# Patient Record
Sex: FEMALE | Race: WHITE | NOT HISPANIC OR LATINO | Employment: PART TIME | RURAL
[De-identification: names, ages, dates, MRNs, and addresses within clinical notes are randomized per-mention and may not be internally consistent; named-entity substitution may affect disease eponyms.]

---

## 2020-03-25 ENCOUNTER — HISTORICAL (OUTPATIENT)
Dept: ADMINISTRATIVE | Facility: HOSPITAL | Age: 65
End: 2020-03-25

## 2020-03-25 LAB — GLUCOSE SERPL-MCNC: 189 MG/DL (ref 70–105)

## 2020-03-26 LAB
INSULIN SERPL-ACNC: NORMAL U[IU]/ML
LAB AP CLINICAL INFORMATION: NORMAL
LAB AP COMMENTS: NORMAL
LAB AP DIAGNOSIS - HISTORICAL: NORMAL
LAB AP GROSS PATHOLOGY - HISTORICAL: NORMAL
LAB AP SPECIMEN SUBMITTED - HISTORICAL: NORMAL

## 2020-04-01 ENCOUNTER — HISTORICAL (OUTPATIENT)
Dept: ADMINISTRATIVE | Facility: HOSPITAL | Age: 65
End: 2020-04-01

## 2020-04-01 LAB — GLUCOSE SERPL-MCNC: 166 MG/DL (ref 70–105)

## 2020-04-02 LAB

## 2020-11-16 ENCOUNTER — HISTORICAL (OUTPATIENT)
Dept: ADMINISTRATIVE | Facility: HOSPITAL | Age: 65
End: 2020-11-16

## 2020-12-15 ENCOUNTER — HISTORICAL (OUTPATIENT)
Dept: ADMINISTRATIVE | Facility: HOSPITAL | Age: 65
End: 2020-12-15

## 2020-12-15 LAB
GLUCOSE SERPL-MCNC: 130 MG/DL (ref 70–105)
GLUCOSE SERPL-MCNC: 166 MG/DL (ref 70–105)
HCT VFR BLD AUTO: 33.8 % (ref 38–47)
HGB BLD-MCNC: 11.9 G/DL (ref 12–16)
POTASSIUM SERPL-SCNC: 3.3 MMOL/L (ref 3.5–5.1)

## 2021-02-04 ENCOUNTER — HISTORICAL (OUTPATIENT)
Dept: ADMINISTRATIVE | Facility: HOSPITAL | Age: 66
End: 2021-02-04

## 2021-02-05 ENCOUNTER — HISTORICAL (OUTPATIENT)
Dept: ADMINISTRATIVE | Facility: HOSPITAL | Age: 66
End: 2021-02-05

## 2021-07-26 ENCOUNTER — PROCEDURE VISIT (OUTPATIENT)
Dept: SLEEP MEDICINE | Facility: HOSPITAL | Age: 66
End: 2021-07-26
Payer: COMMERCIAL

## 2021-07-26 DIAGNOSIS — G47.30 SLEEP APNEA: ICD-10-CM

## 2021-07-26 DIAGNOSIS — G47.30 SLEEP APNEA: Primary | ICD-10-CM

## 2021-07-26 PROCEDURE — 95806 SLEEP STUDY UNATT&RESP EFFT: CPT | Mod: PO

## 2021-09-22 ENCOUNTER — OFFICE VISIT (OUTPATIENT)
Dept: CARDIOLOGY | Facility: CLINIC | Age: 66
End: 2021-09-22
Payer: COMMERCIAL

## 2021-09-22 VITALS
HEIGHT: 64 IN | BODY MASS INDEX: 31.76 KG/M2 | SYSTOLIC BLOOD PRESSURE: 112 MMHG | HEART RATE: 70 BPM | RESPIRATION RATE: 16 BRPM | WEIGHT: 186 LBS | DIASTOLIC BLOOD PRESSURE: 70 MMHG

## 2021-09-22 DIAGNOSIS — I10 HYPERTENSION, ESSENTIAL: ICD-10-CM

## 2021-09-22 DIAGNOSIS — K21.9 GASTROESOPHAGEAL REFLUX DISEASE WITHOUT ESOPHAGITIS: ICD-10-CM

## 2021-09-22 DIAGNOSIS — E78.5 HYPERLIPIDEMIA, UNSPECIFIED HYPERLIPIDEMIA TYPE: ICD-10-CM

## 2021-09-22 DIAGNOSIS — E11.9 DIABETES MELLITUS WITHOUT COMPLICATION: ICD-10-CM

## 2021-09-22 DIAGNOSIS — I35.8 AORTIC VALVE SCLEROSIS: Primary | ICD-10-CM

## 2021-09-22 PROCEDURE — 93005 ELECTROCARDIOGRAM TRACING: CPT | Mod: PBBFAC | Performed by: INTERNAL MEDICINE

## 2021-09-22 PROCEDURE — 99214 OFFICE O/P EST MOD 30 MIN: CPT | Mod: PBBFAC | Performed by: INTERNAL MEDICINE

## 2021-09-22 PROCEDURE — 99214 PR OFFICE/OUTPT VISIT, EST, LEVL IV, 30-39 MIN: ICD-10-PCS | Mod: S$PBB,,, | Performed by: INTERNAL MEDICINE

## 2021-09-22 PROCEDURE — 93010 EKG 12-LEAD: ICD-10-PCS | Mod: S$PBB,,, | Performed by: INTERNAL MEDICINE

## 2021-09-22 PROCEDURE — 99214 OFFICE O/P EST MOD 30 MIN: CPT | Mod: S$PBB,,, | Performed by: INTERNAL MEDICINE

## 2021-09-22 PROCEDURE — 93010 ELECTROCARDIOGRAM REPORT: CPT | Mod: S$PBB,,, | Performed by: INTERNAL MEDICINE

## 2021-09-27 PROBLEM — I10 HYPERTENSION, ESSENTIAL: Status: ACTIVE | Noted: 2021-09-27

## 2021-09-27 PROBLEM — K21.9 GASTROESOPHAGEAL REFLUX DISEASE WITHOUT ESOPHAGITIS: Status: ACTIVE | Noted: 2021-09-27

## 2021-09-27 PROBLEM — I35.8 AORTIC VALVE SCLEROSIS: Status: ACTIVE | Noted: 2021-09-27

## 2021-09-27 PROBLEM — E11.9 DIABETES MELLITUS WITHOUT COMPLICATION: Status: ACTIVE | Noted: 2021-09-27

## 2021-09-27 PROBLEM — E78.5 HYPERLIPIDEMIA: Status: ACTIVE | Noted: 2021-09-27

## 2022-01-31 ENCOUNTER — HOSPITAL ENCOUNTER (OUTPATIENT)
Dept: RADIOLOGY | Facility: HOSPITAL | Age: 67
Discharge: HOME | End: 2022-01-31
Attending: FAMILY MEDICINE
Payer: COMMERCIAL

## 2022-01-31 ENCOUNTER — HOSPITAL ENCOUNTER (OUTPATIENT)
Dept: RADIOLOGY | Facility: HOSPITAL | Age: 67
Discharge: HOME | End: 2022-01-31
Attending: FAMILY MEDICINE
Payer: MEDICARE

## 2022-01-31 DIAGNOSIS — M25.579 ANKLE PAIN: ICD-10-CM

## 2022-01-31 DIAGNOSIS — M79.673 FOOT PAIN: Primary | ICD-10-CM

## 2022-01-31 DIAGNOSIS — M79.673 FOOT PAIN: ICD-10-CM

## 2022-01-31 PROCEDURE — 73630 X-RAY EXAM OF FOOT: CPT | Mod: TC,LT

## 2022-01-31 PROCEDURE — 73610 X-RAY EXAM OF ANKLE: CPT | Mod: TC,LT

## 2022-02-07 ENCOUNTER — HOSPITAL ENCOUNTER (OUTPATIENT)
Dept: CARDIOLOGY | Facility: HOSPITAL | Age: 67
Discharge: HOME | End: 2022-02-07
Attending: INTERNAL MEDICINE
Payer: COMMERCIAL

## 2022-02-07 DIAGNOSIS — I35.8 AORTIC VALVE SCLEROSIS: ICD-10-CM

## 2022-02-07 PROCEDURE — 93306 TTE W/DOPPLER COMPLETE: CPT | Mod: 26,,, | Performed by: INTERNAL MEDICINE

## 2022-02-07 PROCEDURE — 93306 TTE W/DOPPLER COMPLETE: CPT

## 2022-02-07 PROCEDURE — 93306 ECHO (CUPID ONLY): ICD-10-PCS | Mod: 26,,, | Performed by: INTERNAL MEDICINE

## 2022-02-21 ENCOUNTER — HOSPITAL ENCOUNTER (OUTPATIENT)
Dept: RADIOLOGY | Facility: HOSPITAL | Age: 67
Discharge: HOME | End: 2022-02-21
Attending: ORTHOPAEDIC SURGERY
Payer: MEDICARE

## 2022-02-21 DIAGNOSIS — M25.572 LEFT ANKLE PAIN, UNSPECIFIED CHRONICITY: ICD-10-CM

## 2022-02-21 PROBLEM — M76.72 PERONEAL TENDINITIS OF LEFT LOWER LEG: Status: ACTIVE | Noted: 2022-02-21

## 2022-02-21 PROCEDURE — 73610 X-RAY EXAM OF ANKLE: CPT | Mod: TC,LT

## 2022-03-07 ENCOUNTER — HOSPITAL ENCOUNTER (OUTPATIENT)
Dept: RADIOLOGY | Facility: HOSPITAL | Age: 67
Discharge: HOME | End: 2022-03-07
Payer: MEDICARE

## 2022-03-07 VITALS — HEIGHT: 64 IN | BODY MASS INDEX: 31.76 KG/M2 | WEIGHT: 186 LBS

## 2022-03-07 DIAGNOSIS — Z12.31 VISIT FOR SCREENING MAMMOGRAM: ICD-10-CM

## 2022-03-07 PROCEDURE — 77067 MAMMO DIGITAL SCREENING BILAT: ICD-10-PCS | Mod: 26,,, | Performed by: RADIOLOGY

## 2022-03-07 PROCEDURE — 77067 SCR MAMMO BI INCL CAD: CPT | Mod: TC

## 2022-03-07 PROCEDURE — 77067 SCR MAMMO BI INCL CAD: CPT | Mod: 26,,, | Performed by: RADIOLOGY

## 2022-03-10 ENCOUNTER — CLINICAL SUPPORT (OUTPATIENT)
Dept: REHABILITATION | Facility: HOSPITAL | Age: 67
End: 2022-03-10
Payer: MEDICARE

## 2022-03-10 DIAGNOSIS — M76.72 PERONEAL TENDINITIS OF LEFT LOWER LEG: ICD-10-CM

## 2022-03-10 DIAGNOSIS — M25.572 LEFT ANKLE PAIN, UNSPECIFIED CHRONICITY: ICD-10-CM

## 2022-03-10 PROCEDURE — 97035 APP MDLTY 1+ULTRASOUND EA 15: CPT

## 2022-03-10 PROCEDURE — 97161 PT EVAL LOW COMPLEX 20 MIN: CPT

## 2022-03-10 NOTE — PLAN OF CARE
[facility]  Physical Therapy Initial Evaluation    Name: Rafia Singh  Clinic Number: [id number]    Therapy Diagnosis:   Encounter Diagnoses   Name Primary?    Left ankle pain, unspecified chronicity     Peroneal tendinitis of left lower leg      Physician: Antonio Squires MD    Physician Orders: PT Eval and Treat   Medical Diagnosis from Referral: left ankle peroneal tendinitis   Evaluation Date: 3/10/2022  Progress Report due 4/14/22  Plan of Care Expiration: 4/15/22  Visit # / Visits authorized: 1/ 11    Time In: 0935  Time Out: 1020  Total Appointment Time (timed & untimed codes): 45 minutes    Precautions: Standard    Subjective   Date of onset: 7 weeks of ankle pain with insidious onset  History of current condition - Rafia reports: She had a knot that came up on the back of her foot and was painful about 7 weeks ago.  She is having severe pain in all positions and all activities.  She is having burning in her ankle.  Her MRI showed a stress fracture of her Cuboid bone.      Medical History:   Past Medical History:   Diagnosis Date    Diabetes mellitus, type 2     NIDDM    GERD (gastroesophageal reflux disease)     High cholesterol     High triglycerides     Hypertension     Murmur     MVP (mitral valve prolapse)        Surgical History:   Rafia Singh  has a past surgical history that includes Tubal ligation; Hysterectomy; Cholecystectomy (2010); trigger thumb; Shoulder arthroscopy (Left, 12/2020); Sinus surgery; and Oophorectomy.    Medications:   Rafia has a current medication list which includes the following prescription(s): amlodipine, colestipol, furosemide, glimepiride, lisinopril, metformin, omeprazole, and sertraline.    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 with weight bearing  Prior Level of Function: independent  Current Level of Function: limiting work due to pain    Pain:  Current 7/10, worst 10/10, best 6/10   Location: left feet   Description: Burning and Throbbing  Aggravating Factors: "everything hurts, standing, sitting, lying down"  Easing Factors: pain medication, ice, heating pad and hot bath    Pts goals: reduce her pain and return to full time work     Objective     Incisions: N/A  Girth Measurements: Right 36.5cm Left 37.5cm  Comments:      Range of motion:  Motion Right Left    Hip flexion  WITHIN FUNCTIONAL LIMITS  WITHIN FUNCTIONAL LIMITS   Hip extension  WITHIN FUNCTIONAL LIMITS  WITHIN FUNCTIONAL LIMITS   Hip abduction  WITHIN FUNCTIONAL LIMITS  WITHIN FUNCTIONAL LIMITS   Hip adduction  WITHIN FUNCTIONAL LIMITS  WITHIN FUNCTIONAL LIMITS   Internal rotation  WITHIN FUNCTIONAL LIMITS  WITHIN FUNCTIONAL LIMITS   External rotation  WITHIN FUNCTIONAL LIMITS  WITHIN FUNCTIONAL LIMITS   Knee extension  WITHIN FUNCTIONAL LIMITS  WITHIN FUNCTIONAL LIMITS   Knee flexion  WITHIN FUNCTIONAL LIMITS  WITHIN FUNCTIONAL LIMITS   Ankle DF  5  5   Ankle PF  58  57   Ankle Inversion  34  38   Ankle Eversion  13  15       Manual muscle test   Muscle Right  Left    Hip flexion  MMT strength: 5/5  MMT strength: 5/5   Hip extension  MMT strength: 5/5  MMT strength: 5/5   Hip abduction  MMT strength: 5/5  MMT strength: 5/5   Hip adduction  MMT strength: 5/5  MMT strength: 5/5   Hip internal rotation  MMT strength: 5/5  MMT strength: 5/5   Hip external rotation  MMT strength: 5/5  MMT strength: 5/5   Knee extension  MMT strength: 5/5  MMT strength: 5/5   Knee flexion  MMT strength: 5/5  MMT strength: 5/5   Ankle DF  MMT strength: 5/5  MMT strength: 3+/5   Ankle PF  MMT strength: 5/5  MMT strength: 3+/5   Ankle inversion  MMT strength: 5/5  MMT strength: 3+/5   Ankle eversion  MMT " strength: 5/5  MMT strength: 3+/5       Gait:  Weight bearing precautions: FWB  Assistive device: none  Ambulation distance and deviations: Patient ambulating with walking boot on left foot with minimal gait deviations  Stairs:NT  Comments:      Clinical Special Tests: Patient with pain with palpation of left peroneal tendon path as well as left achilles tendon  Comments:    Limitation/Restriction for FOTO Intake Survey    Therapist reviewed FOTO scores for Rafia Singh on 3/10/2022.   FOTO documents entered into [employer] - see Media section.    Limitation Score: 41%         TREATMENT   Treatment Time In: 0955  Treatment Time Out: 1010  Total Treatment time (time-based codes) separate from Evaluation: 15 minutes    Rafia received the treatments listed below:  THERAPEUTIC EXERCISES to develop flexibility for 5 minutes including left ankle peroneal stretch x 3 wtih 10 second holds and gastroc stretch with towel x 30 sec x 1  DIRECT CONTACT MODALITIES after being cleared for contraindications: Ultrasound:  Rafia received ultrasound to manage pain and inflammation at 3 % duty cycle applied to the left ankle at an intensity of  1.6 W/cm2  for a duration of 8  minutes. Patient tolerated treatment well without adverse effects. Therapist was in attendance throughout intervention..    Home Exercises and Patient Education Provided    Education provided:   - Eval results, Plan of care, and HEP    Written Home Exercises Provided: yes.  Exercises were reviewed and Rafia was able to demonstrate them prior to the end of the session.  Rafia demonstrated good  understanding of the education provided.     See EMR under Patient Instructions for exercises provided 3/10/2022.    Assessment   Rafia is a 66 y.o. female referred to outpatient Physical Therapy with a medical diagnosis of left peroneal tendonitis. Pt presents with pain in left foot with all positions, discomfort with ROM and stretches, and pain with ambulation.  I feel PT  intervention is indicated    Pt prognosis is Good.   Pt will benefit from skilled outpatient Physical Therapy to address the deficits stated above and in the chart below, provide pt/family education, and to maximize pt's level of independence.     Plan of care discussed with patient: Yes  Pt's spiritual, cultural and educational needs considered and patient is agreeable to the plan of care and goals as stated below:     Anticipated Barriers for therapy: patient reports LE nerve sensations with burning possibly indicative of neuropathy    Medical Necessity is demonstrated by the following  History  Co-morbidities and personal factors that may impact the plan of care Co-morbidities:   advanced age    Personal Factors:   no deficits     low   Examination  Body Structures and Functions, activity limitations and participation restrictions that may impact the plan of care Body Regions:   lower extremities    Body Systems:    gross symmetry  ROM  strength  gross coordinated movement  balance  gait  transfers    Participation Restrictions:   pain    Activity limitations:   Learning and applying knowledge  no deficits    General Tasks and Commands  no deficits    Communication  no deficits    Mobility  walking    Self care  no deficits    Domestic Life  doing house work (cleaning house, washing dishes, laundry)    Interactions/Relationships  no deficits    Life Areas  employment    Community and Social Life  no deficits         moderate   Clinical Presentation stable and uncomplicated low   Decision Making/ Complexity Score: low     Goals:  Short Term Goals: 3 weeks   1. Patient will be independent with HEP for therapy carryover    Long Term Goals: 5 weeks   1. Patient will report pain of 4/10 with work and household activities  2. Patient will demonstrate ability to ambulate without deviation on level surfaces.  3.  Patient will tolerate 30 min of therapy without significant rest to facilitate return to work     Plan   Plan  of care Certification: 3/10/2022 to 4/15/22.    Outpatient Physical Therapy 2 times weekly for 5 weeks to include the following interventions: Electrical Stimulation for pain, Gait Training, Manual Therapy, Moist Heat/ Ice, Neuromuscular Re-ed, Patient Education, Therapeutic Activities, Therapeutic Exercise and Ultrasound.     BENI ARAYA, PT, ATP  03/10/2022

## 2022-03-15 ENCOUNTER — CLINICAL SUPPORT (OUTPATIENT)
Dept: REHABILITATION | Facility: HOSPITAL | Age: 67
End: 2022-03-15
Payer: MEDICARE

## 2022-03-15 DIAGNOSIS — M76.72 PERONEAL TENDINITIS OF LEFT LOWER LEG: Primary | ICD-10-CM

## 2022-03-15 PROCEDURE — 97140 MANUAL THERAPY 1/> REGIONS: CPT | Mod: CQ

## 2022-03-15 PROCEDURE — 97110 THERAPEUTIC EXERCISES: CPT | Mod: CQ

## 2022-03-15 PROCEDURE — 97035 APP MDLTY 1+ULTRASOUND EA 15: CPT | Mod: CQ

## 2022-03-15 NOTE — PROGRESS NOTES
"  Physical Therapy Treatment Note     Name: Rafia Singh  Clinic Number: [id number]    Therapy Diagnosis: No diagnosis found.  Physician: Antonio Squires MD    Visit Date: 3/15/2022    Physician Orders: PT Eval and Treat   Medical Diagnosis from Referral: left ankle peroneal tendinitis       Evaluation Date: 3/10/2022  Progress Report due 4/14/22  Plan of Care Expiration: 4/15/22  Visit # / Visits authorized: 2/ 11  PTA Visit #: 1    Time In: 1525  Time Out: 1613  Total Billable Time: 48 minutes    Precautions: Standard       Subjective     Pt reports: "It just hurts constantly and is usually worse at night."  She was compliant with home exercise program.  Response to previous treatment: none yet  Functional change: none yet    Pain: 5/10  Location: left foot      Objective     Rafia received therapeutic exercises to develop strength and ROM for 32 minutes including:  NuStep x 6 min  Theraband PF with Inversion using yellow band 2 x 10  Theraband DF with yellow band 2 x 10  Left peroneal stretch 3 x 20"  DF towel stretch 3 x 30"  Inversion/Eversion stretch 3 x 20" each  [place]       Rafia received the following manual therapy techniques: Ice massage were applied to the: left ankle for 8 minutes, including:  Ice massage to Left ankle    Rafia received the following direct contact modalities after being cleared for contraindications: Ultrasound:  Rafia received ultrasound to manage pain and inflammation at 50 % duty cycle applied to the left ankle at an intensity of 1.5 W/cm2  for a duration of 8 minutes. Patient tolerated treatment well without adverse effects. Therapist was in attendance throughout intervention.      Home Exercises Provided and Patient Education Provided     Education provided: on eval    Written Home Exercises Provided: Patient instructed to cont prior HEP.  Exercises were reviewed and Rafia was able to demonstrate them prior to the end of the session.  Rafia demonstrated good  " understanding of the education provided.     See EMR under Patient Instructions for exercises provided 3/10/2022.    Assessment     Patient with no new complaints this visit.    Rafia Is progressing well towards her goals.   Pt prognosis is Good.     Pt will continue to benefit from skilled outpatient physical therapy to address the deficits listed in the problem list box on initial evaluation, provide pt/family education and to maximize pt's level of independence in the home and community environment.     Pt's spiritual, cultural and educational needs considered and pt agreeable to plan of care and goals.     Anticipated barriers to physical therapy: patient reports LE nerve sensations with burning possibly indicative of neuropathy    Goals:  Short Term Goals: 3 weeks   1. Patient will be independent with HEP for therapy carryover     Long Term Goals: 5 weeks   1. Patient will report pain of 4/10 with work and household activities  2. Patient will demonstrate ability to ambulate without deviation on level surfaces.  3.  Patient will tolerate 30 min of therapy without significant rest to facilitate return to work     Plan     Will continue with POC as appropriate.    Tony Mann, PTA  3/15/2022

## 2022-03-18 ENCOUNTER — CLINICAL SUPPORT (OUTPATIENT)
Dept: REHABILITATION | Facility: HOSPITAL | Age: 67
End: 2022-03-18
Payer: MEDICARE

## 2022-03-18 DIAGNOSIS — M76.72 PERONEAL TENDINITIS OF LEFT LOWER LEG: Primary | ICD-10-CM

## 2022-03-18 PROCEDURE — 97035 APP MDLTY 1+ULTRASOUND EA 15: CPT | Mod: CQ

## 2022-03-18 PROCEDURE — 97110 THERAPEUTIC EXERCISES: CPT | Mod: CQ

## 2022-03-18 NOTE — PROGRESS NOTES
"  Physical Therapy Treatment Note     Name: Rafia Singh  [facility] Number: [id number]    Therapy Diagnosis: No diagnosis found.  Physician: Antonio Squires MD    Visit Date: 3/18/2022    Physician Orders: PT Eval and Treat   Medical Diagnosis from Referral: left ankle peroneal tendinitis       Evaluation Date: 3/10/2022  Progress Report due 4/14/22  Plan of Care Expiration: 4/15/22  Visit # / Visits authorized: 3/ 11  PTA Visit #: 2    Time In: 1402 (Patient tx started prior to being checked in)  Time Out: 1443  Total Billable Time: 41 minutes    Precautions: Standard       Subjective     Pt reports: "I haven't sleep at all the past 3 nights. It just constantly burns all night."  She was compliant with home exercise program.  Response to previous treatment: none yet  Functional change: none yet    Pain: 6/10  Location: left foot      Objective     Rafia received therapeutic exercises to develop strength and ROM for 30 minutes including:  NuStep x 6 min  Theraband PF with Inversion using yellow band 2 x 10  Theraband DF with yellow band 2 x 10  Left peroneal stretch 3 x 20"  DF towel stretch 3 x 30"  Inversion/Eversion stretch 3 x 20" each  [place]       Rafia received the following manual therapy techniques: Ice massage were applied to the: left ankle for 5 minutes, including:  Ice massage to Left ankle    Rafia received the following direct contact modalities after being cleared for contraindications: Ultrasound:  Rafia received ultrasound to manage pain and inflammation at 50 % duty cycle applied to the left ankle at an intensity of 1.5 W/cm2  for a duration of 8 minutes. Patient tolerated treatment well without adverse effects. Therapist was in attendance throughout intervention.      Home Exercises Provided and Patient Education Provided     Education provided: on eval    Written Home Exercises Provided: Patient instructed to cont prior HEP.  Exercises were reviewed and Rafia was able to demonstrate " them prior to the end of the session.  Rafia demonstrated good  understanding of the education provided.     See EMR under Patient Instructions for exercises provided 3/10/2022.    Assessment     Patient with no new complaints this visit.    Rfaia Is progressing well towards her goals.   Pt prognosis is Good.     Pt will continue to benefit from skilled outpatient physical therapy to address the deficits listed in the problem list box on initial evaluation, provide pt/family education and to maximize pt's level of independence in the home and community environment.     Pt's spiritual, cultural and educational needs considered and pt agreeable to plan of care and goals.     Anticipated barriers to physical therapy: patient reports LE nerve sensations with burning possibly indicative of neuropathy    Goals:  Short Term Goals: 3 weeks   1. Patient will be independent with HEP for therapy carryover     Long Term Goals: 5 weeks   1. Patient will report pain of 4/10 with work and household activities  2. Patient will demonstrate ability to ambulate without deviation on level surfaces.  3.  Patient will tolerate 30 min of therapy without significant rest to facilitate return to work     Plan     Will continue with POC as appropriate.    Tony Mann, MACARIO  3/18/2022

## 2022-03-23 ENCOUNTER — CLINICAL SUPPORT (OUTPATIENT)
Dept: REHABILITATION | Facility: HOSPITAL | Age: 67
End: 2022-03-23
Payer: MEDICARE

## 2022-03-23 DIAGNOSIS — M76.72 PERONEAL TENDINITIS OF LEFT LOWER LEG: Primary | ICD-10-CM

## 2022-03-23 LAB
AORTIC ROOT ANNULUS: 2.1 CM
AORTIC VALVE CUSP SEPERATION: 17.3 CM
AV INDEX (PROSTH): 0.55
AV MEAN GRADIENT: 6 MMHG
AV PEAK GRADIENT: 4 MMHG
AV VALVE AREA: 1.24 CM2
AV VELOCITY RATIO: 1
CV ECHO LV RWT: 0.41 CM
DOP CALC AO PEAK VEL: 1 M/S
DOP CALC AO VTI: 31 CM
DOP CALC LVOT AREA: 2.3 CM2
DOP CALC LVOT DIAMETER: 1.7 CM
DOP CALC LVOT PEAK VEL: 1 M/S
DOP CALC LVOT STROKE VOLUME: 38.57 CM3
DOP CALC MV VTI: 34 CM
DOP CALCLVOT PEAK VEL VTI: 17 CM
E WAVE DECELERATION TIME: 238 MSEC
ECHO EF ESTIMATED: 60 %
ECHO LV POSTERIOR WALL: 0.95 CM (ref 0.6–1.1)
EJECTION FRACTION: 60 %
FRACTIONAL SHORTENING: 34 % (ref 28–44)
INTERVENTRICULAR SEPTUM: 1.03 CM (ref 0.6–1.1)
IVC OSTIUM: 1 CM
LEFT ATRIUM SIZE: 3.3 CM
LEFT INTERNAL DIMENSION IN SYSTOLE: 3 CM (ref 2.1–4)
LEFT VENTRICLE DIASTOLIC VOLUME: 96.3 ML
LEFT VENTRICLE SYSTOLIC VOLUME: 35 ML
LEFT VENTRICULAR INTERNAL DIMENSION IN DIASTOLE: 4.58 CM (ref 3.5–6)
LEFT VENTRICULAR MASS: 155.54 G
LVOT MG: 2 MMHG
MV MEAN GRADIENT: 3 MMHG
MV PEAK E VEL: 1.11 M/S
MV PEAK GRADIENT: 8 MMHG
MV STENOSIS PRESSURE HALF TIME: 79 MS
MV VALVE AREA BY CONTINUITY EQUATION: 1.13 CM2
MV VALVE AREA P 1/2 METHOD: 2.78 CM2
PISA TR MAX VEL: 2.3 M/S
RA MAJOR: 3.8 CM
RA PRESSURE: 3 MMHG
RIGHT VENTRICULAR END-DIASTOLIC DIMENSION: 4.5 CM
TR MAX PG: 21 MMHG
TRICUSPID ANNULAR PLANE SYSTOLIC EXCURSION: 1.9 CM
TV REST PULMONARY ARTERY PRESSURE: 24 MMHG

## 2022-03-23 PROCEDURE — 97110 THERAPEUTIC EXERCISES: CPT | Mod: CQ

## 2022-03-23 PROCEDURE — 97035 APP MDLTY 1+ULTRASOUND EA 15: CPT | Mod: CQ

## 2022-03-23 NOTE — PROGRESS NOTES
"  Physical Therapy Treatment Note     Name: Rafia Singh  [facility] Number: [id number]    Therapy Diagnosis: No diagnosis found.  Physician: Antonio Squires MD    Visit Date: 3/23/2022    Physician Orders: PT Eval and Treat   Medical Diagnosis from Referral: left ankle peroneal tendinitis       Evaluation Date: 3/10/2022  Progress Report due 4/14/22  Plan of Care Expiration: 4/15/22  Visit # / Visits authorized: 4/ 11  PTA Visit #: 3    Time In: 0759 (Patient tx started prior to being checked in)  Time Out: 0841  Total Billable Time: 42 minutes    Precautions: Standard       Subjective     Pt reports: "I had about 1 hour of sleep last night. It's definitely a problem."  She was compliant with home exercise program.  Response to previous treatment: none yet  Functional change: none yet    Pain: 6/10  Location: left foot      Objective     Rafia received therapeutic exercises to develop strength and ROM for 34 minutes including:  NuStep x 6 min  Theraband PF with Inversion using yellow band 2 x 10  Theraband DF with yellow band 2 x 10  Left peroneal stretch 3 x 20"  DF towel stretch 3 x 30"  Inversion/Eversion stretch 3 x 20" each  [place]       Rafia received the following direct contact modalities after being cleared for contraindications: Ultrasound:  Rafia received ultrasound to manage pain and inflammation at 100 % duty cycle applied to the left ankle at an intensity of 1.5 W/cm2  for a duration of 8 minutes. Patient tolerated treatment well without adverse effects. Therapist was in attendance throughout intervention.      Home Exercises Provided and Patient Education Provided     Education provided: on eval    Written Home Exercises Provided: Patient instructed to cont prior HEP.  Exercises were reviewed and Rafia was able to demonstrate them prior to the end of the session.  Rafia demonstrated good  understanding of the education provided.     See EMR under Patient Instructions for exercises provided " 3/10/2022.    Assessment     Patient able to perform all exercises with no increase in pain.    Rafia Is progressing well towards her goals.   Pt prognosis is Good.     Pt will continue to benefit from skilled outpatient physical therapy to address the deficits listed in the problem list box on initial evaluation, provide pt/family education and to maximize pt's level of independence in the home and community environment.     Pt's spiritual, cultural and educational needs considered and pt agreeable to plan of care and goals.     Anticipated barriers to physical therapy: patient reports LE nerve sensations with burning possibly indicative of neuropathy    Goals:  Short Term Goals: 3 weeks   1. Patient will be independent with HEP for therapy carryover     Long Term Goals: 5 weeks   1. Patient will report pain of 4/10 with work and household activities  2. Patient will demonstrate ability to ambulate without deviation on level surfaces.  3.  Patient will tolerate 30 min of therapy without significant rest to facilitate return to work     Plan     Will continue with POC as appropriate.    Tony Mann, PTA  3/23/2022

## 2022-03-24 ENCOUNTER — CLINICAL SUPPORT (OUTPATIENT)
Dept: REHABILITATION | Facility: HOSPITAL | Age: 67
End: 2022-03-24
Payer: MEDICARE

## 2022-03-24 DIAGNOSIS — M76.72 PERONEAL TENDINITIS OF LEFT LOWER LEG: Primary | ICD-10-CM

## 2022-03-24 PROCEDURE — 97110 THERAPEUTIC EXERCISES: CPT | Mod: CQ

## 2022-03-24 NOTE — PROGRESS NOTES
"  Physical Therapy Treatment Note     Name: Rafia Singh  [facility] Number: [id number]    Therapy Diagnosis: No diagnosis found.  Physician: Antonio Squires MD    Visit Date: 3/24/2022    Physician Orders: PT Eval and Treat   Medical Diagnosis from Referral: left ankle peroneal tendinitis       Evaluation Date: 3/10/2022  Progress Report due 4/14/22  Plan of Care Expiration: 4/15/22  Visit # / Visits authorized: 5/ 11  PTA Visit #: 4    Time In: 1538  Time Out: 1625  Total Billable Time: 47 minutes    Precautions: Standard       Subjective     Pt reports: Patient stated her pain is doing better today then it was yesterday  She was compliant with home exercise program.  Response to previous treatment: none yet  Functional change: none yet    Pain: 4/10  Location: left foot      Objective     Rafia received therapeutic exercises to develop strength and ROM for 47 minutes including:  NuStep x 6 min  Theraband PF with Inversion using yellow band 2 x 10  Theraband DF with yellow band 2 x 10  Left peroneal stretch 3 x 20"  DF towel stretch 3 x 30"  Inversion/Eversion stretch 3 x 20" each  [place]       Rafia received the following direct contact modalities after being cleared for contraindications: Ultrasound:  Rafia received ultrasound to manage pain and inflammation at 100 % duty cycle applied to the left ankle at an intensity of 1.5 W/cm2  for a duration of 0 minutes. Patient tolerated treatment well without adverse effects. Therapist was in attendance throughout intervention.      Home Exercises Provided and Patient Education Provided     Education provided: on eval    Written Home Exercises Provided: Patient instructed to cont prior HEP.  Exercises were reviewed and Rafia was able to demonstrate them prior to the end of the session.  Rafia demonstrated good  understanding of the education provided.     See EMR under Patient Instructions for exercises provided 3/10/2022.    Assessment     Patient able to " perform all exercises with no increase in pain.    Rafia Is progressing well towards her goals.   Pt prognosis is Good.     Pt will continue to benefit from skilled outpatient physical therapy to address the deficits listed in the problem list box on initial evaluation, provide pt/family education and to maximize pt's level of independence in the home and community environment.     Pt's spiritual, cultural and educational needs considered and pt agreeable to plan of care and goals.     Anticipated barriers to physical therapy: patient reports LE nerve sensations with burning possibly indicative of neuropathy    Goals:  Short Term Goals: 3 weeks   1. Patient will be independent with HEP for therapy carryover     Long Term Goals: 5 weeks   1. Patient will report pain of 4/10 with work and household activities  2. Patient will demonstrate ability to ambulate without deviation on level surfaces.  3.  Patient will tolerate 30 min of therapy without significant rest to facilitate return to work     Plan     Will continue with POC as appropriate.    Soledad Gibson, PTA  3/24/2022

## 2022-04-01 ENCOUNTER — CLINICAL SUPPORT (OUTPATIENT)
Dept: REHABILITATION | Facility: HOSPITAL | Age: 67
End: 2022-04-01
Payer: MEDICARE

## 2022-04-01 DIAGNOSIS — M76.72 PERONEAL TENDINITIS OF LEFT LOWER LEG: ICD-10-CM

## 2022-04-01 PROCEDURE — 97035 APP MDLTY 1+ULTRASOUND EA 15: CPT

## 2022-04-01 PROCEDURE — 97140 MANUAL THERAPY 1/> REGIONS: CPT

## 2022-04-01 PROCEDURE — 97110 THERAPEUTIC EXERCISES: CPT

## 2022-04-01 NOTE — PROGRESS NOTES
Physical Therapy Treatment Note     Name: Rafia Singh  [facility] Number: [id number]    Therapy Diagnosis: No diagnosis found.  Physician: Antonio Squires MD    Visit Date: 4/1/2022    Physician Orders: PT Eval and Treat   Medical Diagnosis from Referral: left ankle peroneal tendinitis       Evaluation Date: 3/10/2022  Progress Report due 4/14/22  Plan of Care Expiration: 4/15/22  Visit # / Visits authorized: 6/ 11  PTA Visit #: 0    Time In: 1:58 pm  Time Out: 2:55 pm  Total Billable Time: 57 minutes    Precautions: Standard       Subjective     Pt reports: she's feeling pretty good today  She was compliant with home exercise program.  Response to previous treatment: none yet  Functional change: none yet    Pain: 5/10- but hasn't taken any medication  Location: left foot      Objective     Rafia received therapeutic exercises to develop strength and ROM for 20 minutes including:  NuStep x 5 min  4 way ankle with band: red x 20  Towel scrunches: x 30     Rafia received manual therapy to left ankle for 24 minutes including:  Massage to left lateral ankle to decrease pain and swelling  KT tape applied to lateral ankle to pull edema from ankle    Rafai received the following direct contact modalities after being cleared for contraindications: Ultrasound:  Rafia received ultrasound to manage pain and inflammation at 100 % duty cycle applied to the left ankle at an intensity of 1.0 W/cm2  for a duration of 8 minutes. Patient tolerated treatment well without adverse effects. Therapist was in attendance throughout intervention.    Rafia received ice massage to left lateral ankle for 5 minutes to decrease pain and inflammation. No adverse reactions noted.       Home Exercises Provided and Patient Education Provided     Education provided: on eval    Written Home Exercises Provided: Patient instructed to cont prior HEP.  Exercises were reviewed and Rafia was able to demonstrate them prior to the end of the session.   Rafia demonstrated good  understanding of the education provided.     See EMR under Patient Instructions for exercises provided 3/10/2022.    Assessment     Patient tolerated all activities well. She reported relief at end of treatment. She was educated to continue to elevate and ice left ankle to decrease swelling. She verbalized understanding.     Rafia Is progressing well towards her goals.   Pt prognosis is Good.     Pt will continue to benefit from skilled outpatient physical therapy to address the deficits listed in the problem list box on initial evaluation, provide pt/family education and to maximize pt's level of independence in the home and community environment.     Pt's spiritual, cultural and educational needs considered and pt agreeable to plan of care and goals.     Anticipated barriers to physical therapy: patient reports LE nerve sensations with burning possibly indicative of neuropathy    Goals:  Short Term Goals: 3 weeks   1. Patient will be independent with HEP for therapy carryover     Long Term Goals: 5 weeks   1. Patient will report pain of 4/10 with work and household activities  2. Patient will demonstrate ability to ambulate without deviation on level surfaces.  3.  Patient will tolerate 30 min of therapy without significant rest to facilitate return to work     Plan     Will continue with POC as appropriate.    Andreia Durant DPT, ATC   4/1/2022

## 2022-04-04 ENCOUNTER — OFFICE VISIT (OUTPATIENT)
Dept: CARDIOLOGY | Facility: CLINIC | Age: 67
End: 2022-04-04
Payer: MEDICARE

## 2022-04-04 VITALS
BODY MASS INDEX: 32.52 KG/M2 | DIASTOLIC BLOOD PRESSURE: 74 MMHG | HEART RATE: 70 BPM | SYSTOLIC BLOOD PRESSURE: 126 MMHG | RESPIRATION RATE: 16 BRPM | HEIGHT: 64 IN | WEIGHT: 190.5 LBS

## 2022-04-04 DIAGNOSIS — E11.9 DIABETES MELLITUS WITHOUT COMPLICATION: Chronic | ICD-10-CM

## 2022-04-04 DIAGNOSIS — G47.33 OSA ON CPAP: Chronic | ICD-10-CM

## 2022-04-04 DIAGNOSIS — K21.9 GASTROESOPHAGEAL REFLUX DISEASE WITHOUT ESOPHAGITIS: Chronic | ICD-10-CM

## 2022-04-04 DIAGNOSIS — I10 HYPERTENSION, ESSENTIAL: Primary | Chronic | ICD-10-CM

## 2022-04-04 DIAGNOSIS — E78.5 HYPERLIPIDEMIA, UNSPECIFIED HYPERLIPIDEMIA TYPE: Chronic | ICD-10-CM

## 2022-04-04 DIAGNOSIS — I35.8 AORTIC VALVE SCLEROSIS: Chronic | ICD-10-CM

## 2022-04-04 PROCEDURE — 3008F PR BODY MASS INDEX (BMI) DOCUMENTED: ICD-10-PCS | Mod: CPTII,,, | Performed by: INTERNAL MEDICINE

## 2022-04-04 PROCEDURE — 93010 EKG 12-LEAD: ICD-10-PCS | Mod: S$PBB,,, | Performed by: INTERNAL MEDICINE

## 2022-04-04 PROCEDURE — 1159F PR MEDICATION LIST DOCUMENTED IN MEDICAL RECORD: ICD-10-PCS | Mod: CPTII,,, | Performed by: INTERNAL MEDICINE

## 2022-04-04 PROCEDURE — 99214 OFFICE O/P EST MOD 30 MIN: CPT | Mod: S$PBB,,, | Performed by: INTERNAL MEDICINE

## 2022-04-04 PROCEDURE — 93005 ELECTROCARDIOGRAM TRACING: CPT | Mod: PBBFAC | Performed by: INTERNAL MEDICINE

## 2022-04-04 PROCEDURE — 3008F BODY MASS INDEX DOCD: CPT | Mod: CPTII,,, | Performed by: INTERNAL MEDICINE

## 2022-04-04 PROCEDURE — 93010 ELECTROCARDIOGRAM REPORT: CPT | Mod: S$PBB,,, | Performed by: INTERNAL MEDICINE

## 2022-04-04 PROCEDURE — 99214 OFFICE O/P EST MOD 30 MIN: CPT | Mod: PBBFAC | Performed by: INTERNAL MEDICINE

## 2022-04-04 PROCEDURE — 4010F PR ACE/ARB THEARPY RXD/TAKEN: ICD-10-PCS | Mod: CPTII,,, | Performed by: INTERNAL MEDICINE

## 2022-04-04 PROCEDURE — 3074F SYST BP LT 130 MM HG: CPT | Mod: CPTII,,, | Performed by: INTERNAL MEDICINE

## 2022-04-04 PROCEDURE — 1160F RVW MEDS BY RX/DR IN RCRD: CPT | Mod: CPTII,,, | Performed by: INTERNAL MEDICINE

## 2022-04-04 PROCEDURE — 3078F PR MOST RECENT DIASTOLIC BLOOD PRESSURE < 80 MM HG: ICD-10-PCS | Mod: CPTII,,, | Performed by: INTERNAL MEDICINE

## 2022-04-04 PROCEDURE — 1160F PR REVIEW ALL MEDS BY PRESCRIBER/CLIN PHARMACIST DOCUMENTED: ICD-10-PCS | Mod: CPTII,,, | Performed by: INTERNAL MEDICINE

## 2022-04-04 PROCEDURE — 1159F MED LIST DOCD IN RCRD: CPT | Mod: CPTII,,, | Performed by: INTERNAL MEDICINE

## 2022-04-04 PROCEDURE — 3074F PR MOST RECENT SYSTOLIC BLOOD PRESSURE < 130 MM HG: ICD-10-PCS | Mod: CPTII,,, | Performed by: INTERNAL MEDICINE

## 2022-04-04 PROCEDURE — 4010F ACE/ARB THERAPY RXD/TAKEN: CPT | Mod: CPTII,,, | Performed by: INTERNAL MEDICINE

## 2022-04-04 PROCEDURE — 3078F DIAST BP <80 MM HG: CPT | Mod: CPTII,,, | Performed by: INTERNAL MEDICINE

## 2022-04-04 PROCEDURE — 99214 PR OFFICE/OUTPT VISIT, EST, LEVL IV, 30-39 MIN: ICD-10-PCS | Mod: S$PBB,,, | Performed by: INTERNAL MEDICINE

## 2022-04-05 ENCOUNTER — CLINICAL SUPPORT (OUTPATIENT)
Dept: REHABILITATION | Facility: HOSPITAL | Age: 67
End: 2022-04-05
Payer: MEDICARE

## 2022-04-05 DIAGNOSIS — M76.72 PERONEAL TENDINITIS OF LEFT LOWER LEG: Primary | ICD-10-CM

## 2022-04-05 PROCEDURE — 97110 THERAPEUTIC EXERCISES: CPT | Mod: CQ

## 2022-04-05 PROCEDURE — 97140 MANUAL THERAPY 1/> REGIONS: CPT | Mod: CQ

## 2022-04-05 PROCEDURE — 97035 APP MDLTY 1+ULTRASOUND EA 15: CPT | Mod: CQ

## 2022-04-05 NOTE — PROGRESS NOTES
Physical Therapy Treatment Note     Name: Rafia Singh  Clinic Number: [id number]    Therapy Diagnosis: No diagnosis found.  Physician: Antonio Squires MD    Visit Date: 4/5/2022    Physician Orders: PT Eval and Treat   Medical Diagnosis from Referral: left ankle peroneal tendinitis       Evaluation Date: 3/10/2022  Progress Report due 4/14/22  Plan of Care Expiration: 4/15/22  Visit # / Visits authorized: 7/ 11  PTA Visit #: 1    Time In: 1529  Time Out: 1615  Total Billable Time: 46 minutes    Precautions: Standard       Subjective     Pt reports: she's feeling pretty good today  She was compliant with home exercise program.  Response to previous treatment: none yet  Functional change: none yet    Pain: 4/10  Location: left foot      Objective     Rafia received therapeutic exercises to develop strength and ROM for 28 minutes including:  NuStep x 6 min  4 way ankle with band: red x 20  Towel scrunches: x 30     Rafia received the following manual therapy techniques: Myofacial release and PROM stretching were applied to the: left ankle for 10 minutes, including:  MFR to left ankle with biofreeze  PF/DF stretch x 30 sec holds    Rafia received the following direct contact modalities after being cleared for contraindications: Ultrasound:  Rafia received ultrasound to manage pain and inflammation at 100 % duty cycle applied to the left ankle at an intensity of 1.5 W/cm2  for a duration of 8 minutes. Patient tolerated treatment well without adverse effects. Therapist was in attendance throughout intervention.      Home Exercises Provided and Patient Education Provided     Education provided: on eval    Written Home Exercises Provided: Patient instructed to cont prior HEP.  Exercises were reviewed and Rafia was able to demonstrate them prior to the end of the session.  Rafia demonstrated good  understanding of the education provided.     See EMR under Patient Instructions for exercises provided  3/10/2022.    Assessment     Patient able to perform all exercises with no increase in pain.    Rafia Is progressing well towards her goals.   Pt prognosis is Good.     Pt will continue to benefit from skilled outpatient physical therapy to address the deficits listed in the problem list box on initial evaluation, provide pt/family education and to maximize pt's level of independence in the home and community environment.     Pt's spiritual, cultural and educational needs considered and pt agreeable to plan of care and goals.     Anticipated barriers to physical therapy: patient reports LE nerve sensations with burning possibly indicative of neuropathy    Goals:  Short Term Goals: 3 weeks   1. Patient will be independent with HEP for therapy carryover     Long Term Goals: 5 weeks   1. Patient will report pain of 4/10 with work and household activities  2. Patient will demonstrate ability to ambulate without deviation on level surfaces.  3.  Patient will tolerate 30 min of therapy without significant rest to facilitate return to work     Plan     Will continue with POC as appropriate.    Tony Mann, PTA  4/5/2022

## 2022-04-07 ENCOUNTER — CLINICAL SUPPORT (OUTPATIENT)
Dept: REHABILITATION | Facility: HOSPITAL | Age: 67
End: 2022-04-07
Payer: MEDICARE

## 2022-04-07 DIAGNOSIS — M76.72 PERONEAL TENDINITIS OF LEFT LOWER LEG: Primary | ICD-10-CM

## 2022-04-07 PROCEDURE — 97035 APP MDLTY 1+ULTRASOUND EA 15: CPT | Mod: CQ

## 2022-04-07 PROCEDURE — 97110 THERAPEUTIC EXERCISES: CPT | Mod: CQ

## 2022-04-07 PROCEDURE — 97140 MANUAL THERAPY 1/> REGIONS: CPT | Mod: CQ

## 2022-04-07 NOTE — PROGRESS NOTES
[facility] note        DATE OF SERVICE: 04/11/2022       PCP: Chelsea Steele II, MD      CHIEF COMPLAINT:   Chief Complaint   Patient presents with    Follow-up     6 Months/ echo results    Chest Pain     At times    Shortness of Breath     SOB with exertion.    Palpitations     At times    Edema     Bilateral hands and feet at times.    Dizziness        HISTORY OF PRESENT ILLNESS:  Rafia Singh is a 67 y.o. female with a PMH of   Past Medical History:   Diagnosis Date    Aortic valve sclerosis     with murmur    Diabetes mellitus, type 2     NIDDM    GERD (gastroesophageal reflux disease)     High cholesterol     High triglycerides     Hypertension     Murmur     MVP (mitral valve prolapse)      who presents for   Chief Complaint   Patient presents with    Follow-up     6 Months/ echo results    Chest Pain     At times    Shortness of Breath     SOB with exertion.    Palpitations     At times    Edema     Bilateral hands and feet at times.    Dizziness          Review of Systems: Review of Systems   Respiratory: Positive for shortness of breath.    Cardiovascular: Positive for palpitations and leg swelling.   Neurological: Positive for dizziness.        PAST MEDICAL HISTORY:  Past Medical History:   Diagnosis Date    Aortic valve sclerosis     with murmur    Diabetes mellitus, type 2     NIDDM    GERD (gastroesophageal reflux disease)     High cholesterol     High triglycerides     Hypertension     Murmur     MVP (mitral valve prolapse)        PAST SURGICAL HISTORY:  Past Surgical History:   Procedure Laterality Date    CHOLECYSTECTOMY  2010    HYSTERECTOMY      OOPHORECTOMY      SHOULDER ARTHROSCOPY Left 12/2020    SINUS SURGERY      trigger thumb      TUBAL LIGATION         SOCIAL HISTORY:  Social History     Socioeconomic History    Marital status:    Tobacco Use    Smoking status: Never Smoker    Smokeless tobacco: Never Used   Substance and  "Sexual Activity    Alcohol use: Not Currently    Drug use: Never       FAMILY HISTORY:  Family History   Problem Relation Age of Onset    Heart failure Mother     Heart disease Mother     No Known Problems Father     No Known Problems Sister     Heart disease Brother     No Known Problems Maternal Aunt     No Known Problems Maternal Uncle     No Known Problems Paternal Aunt     No Known Problems Paternal Uncle     No Known Problems Maternal Grandmother     No Known Problems Maternal Grandfather     No Known Problems Paternal Grandmother     No Known Problems Paternal Grandfather     Stroke Neg Hx     Diabetes Neg Hx          ALLERGIES:  Review of patient's allergies indicates:   Allergen Reactions    Macrobid [nitrofurantoin monohyd/m-cryst]         MEDICATIONS:    Current Outpatient Medications:     amLODIPine (NORVASC) 5 MG tablet, Take 5 mg by mouth once daily., Disp: , Rfl:     colestipoL (COLESTID) 1 gram Tab, Take 1 g by mouth 2 (two) times daily., Disp: , Rfl:     furosemide (LASIX) 20 MG tablet, Take 20 mg by mouth once daily., Disp: , Rfl:     glimepiride (AMARYL) 4 MG tablet, Take 4 mg by mouth once daily., Disp: , Rfl:     lisinopriL (PRINIVIL,ZESTRIL) 20 MG tablet, Take 20 mg by mouth once daily., Disp: , Rfl:     metFORMIN (GLUCOPHAGE-XR) 500 MG ER 24hr tablet, Take 500 mg by mouth once daily., Disp: , Rfl:     omeprazole (PRILOSEC) 20 MG capsule, Take 20 mg by mouth once daily., Disp: , Rfl:     sertraline (ZOLOFT) 100 MG tablet, Take 1 tablet (100 mg total) by mouth once daily., Disp: 30 tablet, Rfl: 1     PHYSICAL EXAM:  /74 (BP Location: Left arm, Patient Position: Sitting)   Pulse 70   Resp 16   Ht 5' 4" (1.626 m)   Wt 86.4 kg (190 lb 8 oz)   BMI 32.70 kg/m²   Wt Readings from Last 3 Encounters:   04/04/22 86.4 kg (190 lb 8 oz)   03/07/22 84.4 kg (186 lb)   09/22/21 84.4 kg (186 lb)      Body mass index is 32.7 kg/m².    Physical Exam  Vitals reviewed. "   Constitutional:       Appearance: Normal appearance.   HENT:      Head: Normocephalic and atraumatic.   Neck:      Vascular: No carotid bruit or JVD.   Cardiovascular:      Rate and Rhythm: Normal rate and regular rhythm.      Pulses: Normal pulses.           Radial pulses are 2+ on the right side and 2+ on the left side.        Dorsalis pedis pulses are 2+ on the right side and 2+ on the left side.      Heart sounds: Murmur heard.    Systolic murmur is present with a grade of 2/6.     Comments: II/ VI HARI RUSB  Pulmonary:      Effort: Pulmonary effort is normal.      Breath sounds: Normal breath sounds.   Musculoskeletal:      Right lower leg: No edema.      Left lower leg: No edema.   Skin:     General: Skin is warm and dry.   Neurological:      Mental Status: She is alert.         LABS REVIEWED:  Lab Results   Component Value Date    HGB 11.9 (L) 12/15/2020    HCT 33.8 (L) 12/15/2020     Lab Results   Component Value Date    K 3.3 (L) 12/15/2020     No results found for: CPK, AST, ALT  Lab Results   Component Value Date     (H) 12/15/2020     No results found for: CHOL, HDL, TRIG, CHOLHDL    CARDIAC STUDIES REVIEWED:EKG: NSR, 76 bpm    Results for orders placed during the hospital encounter of 02/07/22    Echo    Interpretation Summary  · The left ventricle is normal in size with normal systolic function.  · The estimated ejection fraction is 60%.  · Left ventricular diastolic dysfunction.  · Mild right ventricular enlargement.  · Mild right atrial enlargement.  · Mild tricuspid regurgitation.  · Normal central venous pressure (3 mmHg).  · The estimated PA systolic pressure is 24 mmHg.          ASSESSMENT:   Active Problem List with Overview Notes    Diagnosis Date Noted    Peroneal tendinitis of left lower leg 02/21/2022    Gastroesophageal reflux disease without esophagitis 09/27/2021    Hyperlipidemia 09/27/2021     followed by Dr. Butler      Diabetes mellitus without complication 09/27/2021     Aortic valve sclerosis 09/27/2021     with murmur        Hypertension, essential 09/27/2021     VISIT DIAGNOSIS:  Hypertension, essential  -     EKG 12-lead; Future    Hyperlipidemia, unspecified hyperlipidemia type    Diabetes mellitus without complication    Gastroesophageal reflux disease without esophagitis    JUDI on CPAP    Aortic valve sclerosis  Comments:  without stenosis; with murmur         PLAN:  Orders Placed This Encounter   Procedures    EKG 12-lead     Standing Status:   Future     Number of Occurrences:   1     Standing Expiration Date:   4/4/2023      RTC one year.

## 2022-04-07 NOTE — PROGRESS NOTES
Physical Therapy Treatment Note     Name: Rafia Singh  [facility] Number: [id number]    Therapy Diagnosis: No diagnosis found.  Physician: Antonio Squires MD    Visit Date: 4/7/2022    Physician Orders: PT Eval and Treat   Medical Diagnosis from Referral: left ankle peroneal tendinitis       Evaluation Date: 3/10/2022  Progress Report due 4/14/22  Plan of Care Expiration: 4/15/22  Visit # / Visits authorized: 8/ 11  PTA Visit #: 2    Time In: 1356  Time Out: 1438  Total Billable Time: 42 minutes    Precautions: Standard       Subjective     Pt reports: her foot hurts a little more than it did last visit  She was compliant with home exercise program.  Response to previous treatment: none yet  Functional change: none yet    Pain: 5/10  Location: left foot      Objective     Rafia received therapeutic exercises to develop strength and ROM for 24 minutes including:  NuStep x 6 min  4 way ankle with band: red x 20  Towel scrunches: x 30     Rafia received the following manual therapy techniques: Myofacial release and PROM stretching were applied to the: left ankle for 10 minutes, including:  MFR to left ankle with biofreeze  PF/DF stretch x 30 sec holds    Rafia received the following direct contact modalities after being cleared for contraindications: Ultrasound:  Rafia received ultrasound to manage pain and inflammation at 100 % duty cycle applied to the left ankle at an intensity of 1.5 W/cm2  for a duration of 8 minutes. Patient tolerated treatment well without adverse effects. Therapist was in attendance throughout intervention.      Home Exercises Provided and Patient Education Provided     Education provided: on eval    Written Home Exercises Provided: Patient instructed to cont prior HEP.  Exercises were reviewed and Rafia was able to demonstrate them prior to the end of the session.  Rafia demonstrated good  understanding of the education provided.     See EMR under Patient Instructions for exercises  provided 3/10/2022.    Assessment     Patient able to perform all exercises with no increase in pain. Patient with no new complaints after today's session.    Rafia Is progressing well towards her goals.   Pt prognosis is Good.     Pt will continue to benefit from skilled outpatient physical therapy to address the deficits listed in the problem list box on initial evaluation, provide pt/family education and to maximize pt's level of independence in the home and community environment.     Pt's spiritual, cultural and educational needs considered and pt agreeable to plan of care and goals.     Anticipated barriers to physical therapy: patient reports LE nerve sensations with burning possibly indicative of neuropathy    Goals:  Short Term Goals: 3 weeks   1. Patient will be independent with HEP for therapy carryover     Long Term Goals: 5 weeks   1. Patient will report pain of 4/10 with work and household activities  2. Patient will demonstrate ability to ambulate without deviation on level surfaces.  3.  Patient will tolerate 30 min of therapy without significant rest to facilitate return to work     Plan     Will continue with POC as appropriate.    Tony Mann, PTA  4/7/2022

## 2022-04-11 ENCOUNTER — CLINICAL SUPPORT (OUTPATIENT)
Dept: REHABILITATION | Facility: HOSPITAL | Age: 67
End: 2022-04-11
Payer: MEDICARE

## 2022-04-11 DIAGNOSIS — M76.72 PERONEAL TENDINITIS OF LEFT LOWER LEG: Primary | ICD-10-CM

## 2022-04-11 PROCEDURE — 97035 APP MDLTY 1+ULTRASOUND EA 15: CPT | Mod: CQ

## 2022-04-11 PROCEDURE — 97140 MANUAL THERAPY 1/> REGIONS: CPT | Mod: CQ

## 2022-04-11 PROCEDURE — 97110 THERAPEUTIC EXERCISES: CPT | Mod: CQ

## 2022-04-11 NOTE — PROGRESS NOTES
"  Physical Therapy Treatment Note     Name: Rafia Singh  [facility] Number: [id number]    Therapy Diagnosis: No diagnosis found.  Physician: Antonio Squires MD    Visit Date: 4/11/2022    Physician Orders: PT Eval and Treat   Medical Diagnosis from Referral: left ankle peroneal tendinitis       Evaluation Date: 3/10/2022  Progress Report due 4/14/22  Plan of Care Expiration: 4/15/22  Visit # / Visits authorized: 9/ 11  PTA Visit #: 3    Time In: 1527  Time Out: 1611  Total Billable Time: 44 minutes    Precautions: Standard       Subjective     Pt reports: "I think the rain and cooler weather has it acting up"  She was compliant with home exercise program.    Pain: 4/10  Location: left foot      Objective     Rafia received therapeutic exercises to develop strength and ROM for 24 minutes including:  NuStep x 6 min  4 way ankle with band: red x 30  Towel scrunches: x 30     Rafia received the following manual therapy techniques: Myofacial release and PROM stretching were applied to the: left ankle for 12 minutes, including:  MFR to left ankle with biofreeze  PF/DF stretch x 30 sec holds    Rafia received the following direct contact modalities after being cleared for contraindications: Ultrasound:  Rafia received ultrasound to manage pain and inflammation at 100 % duty cycle applied to the left ankle at an intensity of 1.5 W/cm2  for a duration of 8 minutes. Patient tolerated treatment well without adverse effects. Therapist was in attendance throughout intervention.      Home Exercises Provided and Patient Education Provided     Education provided: on eval    Written Home Exercises Provided: Patient instructed to cont prior HEP.  Exercises were reviewed and Rafia was able to demonstrate them prior to the end of the session.  Rafia demonstrated good  understanding of the education provided.     See EMR under Patient Instructions for exercises provided 3/10/2022.    Assessment     Patient able to perform all " exercises with no increase in pain. Patient with no new complaints after today's session. Patient has MD visit on 4/13.    Rafia Is progressing well towards her goals.   Pt prognosis is Good.     Pt will continue to benefit from skilled outpatient physical therapy to address the deficits listed in the problem list box on initial evaluation, provide pt/family education and to maximize pt's level of independence in the home and community environment.     Pt's spiritual, cultural and educational needs considered and pt agreeable to plan of care and goals.     Anticipated barriers to physical therapy: patient reports LE nerve sensations with burning possibly indicative of neuropathy    Goals:  Short Term Goals: 3 weeks   1. Patient will be independent with HEP for therapy carryover     Long Term Goals: 5 weeks   1. Patient will report pain of 4/10 with work and household activities  2. Patient will demonstrate ability to ambulate without deviation on level surfaces.  3.  Patient will tolerate 30 min of therapy without significant rest to facilitate return to work     Plan     Will continue with POC as appropriate.    Tony Mann, PTA  4/11/2022

## 2022-04-14 ENCOUNTER — CLINICAL SUPPORT (OUTPATIENT)
Dept: REHABILITATION | Facility: HOSPITAL | Age: 67
End: 2022-04-14
Payer: MEDICARE

## 2022-04-14 DIAGNOSIS — M76.72 PERONEAL TENDINITIS OF LEFT LOWER LEG: Primary | ICD-10-CM

## 2022-04-14 PROCEDURE — 97140 MANUAL THERAPY 1/> REGIONS: CPT | Mod: CQ

## 2022-04-14 PROCEDURE — 97035 APP MDLTY 1+ULTRASOUND EA 15: CPT | Mod: CQ

## 2022-04-14 PROCEDURE — 97110 THERAPEUTIC EXERCISES: CPT | Mod: CQ

## 2022-04-14 NOTE — PROGRESS NOTES
Physical Therapy Treatment Note     Name: Rafia Singh  Clinic Number: [id number]    Therapy Diagnosis: No diagnosis found.  Physician: Antonio Squires MD    Visit Date: 4/14/2022    Physician Orders: PT Eval and Treat   Medical Diagnosis from Referral: left ankle peroneal tendinitis       Evaluation Date: 3/10/2022  Progress Report due 4/14/22  Plan of Care Expiration: 4/15/22  Visit # / Visits authorized: 10/ 11  PTA Visit #: 4    Time In: 1358  Time Out: 1442  Total Billable Time: 44 minutes    Precautions: Standard       Subjective     Pt reports: she went to MD visit yesterday and he recommends 4x weeks of therapy before next visit   She was compliant with home exercise program.    Pain: 4/10  Location: left foot      Objective     Rafia received therapeutic exercises to develop strength and ROM for 24 minutes including:  NuStep x 6 min  4 way ankle with band: red x 30  Towel scrunches: x 30     Rafia received the following manual therapy techniques: Myofacial release and PROM stretching were applied to the: left ankle for 12 minutes, including:  MFR to left ankle with biofreeze  PF/DF stretch x 30 sec holds    Rafia received the following direct contact modalities after being cleared for contraindications: Ultrasound:  Rafia received ultrasound to manage pain and inflammation at 100 % duty cycle applied to the left ankle at an intensity of 1.5 W/cm2  for a duration of 8 minutes. Patient tolerated treatment well without adverse effects. Therapist was in attendance throughout intervention.      Home Exercises Provided and Patient Education Provided     Education provided: on eval    Written Home Exercises Provided: Patient instructed to cont prior HEP.  Exercises were reviewed and Rafia was able to demonstrate them prior to the end of the session.  Rafia demonstrated good  understanding of the education provided.     See EMR under Patient Instructions for exercises provided 3/10/2022.    Assessment      Patient able to perform all exercises with no increase in pain.    Rafia Is progressing well towards her goals.   Pt prognosis is Good.     Pt will continue to benefit from skilled outpatient physical therapy to address the deficits listed in the problem list box on initial evaluation, provide pt/family education and to maximize pt's level of independence in the home and community environment.     Pt's spiritual, cultural and educational needs considered and pt agreeable to plan of care and goals.     Anticipated barriers to physical therapy: patient reports LE nerve sensations with burning possibly indicative of neuropathy    Goals:  Short Term Goals: 3 weeks   1. Patient will be independent with HEP for therapy carryover     Long Term Goals: 5 weeks   1. Patient will report pain of 4/10 with work and household activities  2. Patient will demonstrate ability to ambulate without deviation on level surfaces.  3.  Patient will tolerate 30 min of therapy without significant rest to facilitate return to work     Plan     Will continue with POC as appropriate.    Tony Mann, PTA  4/14/2022

## 2022-04-18 ENCOUNTER — CLINICAL SUPPORT (OUTPATIENT)
Dept: REHABILITATION | Facility: HOSPITAL | Age: 67
End: 2022-04-18
Payer: MEDICARE

## 2022-04-18 DIAGNOSIS — M76.72 PERONEAL TENDINITIS OF LEFT LOWER LEG: ICD-10-CM

## 2022-04-18 PROCEDURE — 97140 MANUAL THERAPY 1/> REGIONS: CPT

## 2022-04-18 PROCEDURE — 97110 THERAPEUTIC EXERCISES: CPT

## 2022-04-18 NOTE — PROGRESS NOTES
Physical Therapy Treatment Note     Name: Rafia Singh  [facility] Number: [id number]    Therapy Diagnosis: muscle weakness, difficulty walking  Physician: Antonio Squires MD    Visit Date: 4/18/2022    Physician Orders: PT Eval and Treat   Medical Diagnosis from Referral: left ankle peroneal tendinitis       Evaluation Date: 3/10/2022  Progress Report due 5/16/22  Plan of Care Expiration: 5/16/22  Visit # / Visits authorized: 11/ 11  PTA Visit #: 0    Time In: 1526  Time Out: 1604  Total Billable Time: 38 minutes    Precautions: Standard       Subjective     Pt reports: Patient has left walking boot donned.  She states she was told by MD she has torn ligaments and a left cuboid stress fracture.  She was compliant with home exercise program.    Pain: 5/10  Location: left foot      Objective     Rafia received therapeutic exercises to develop strength and ROM for 12 minutes including:  NuStep x 6 min  Ankle circles x 20 reps        Rafia received the following manual therapy techniques: Myofacial release and PROM stretching were applied to the: left ankle for 26 minutes, including:  MFR to left ankle with biofreeze  PF/DF stretch x 30 sec holds  KT for peroneal tendinitis to aid with decreased pain and improved performance of exercises        Home Exercises Provided and Patient Education Provided     Education provided: on eval    Written Home Exercises Provided: Patient instructed to cont prior HEP.  Exercises were reviewed and Rafia was able to demonstrate them prior to the end of the session.  Rafia demonstrated good  understanding of the education provided.     See EMR under Patient Instructions for exercises provided 3/10/2022.    Assessment     Reassessment completed.  Patient had no complaints of increased pain post treatment.  Instructed to rest, elevate and ice left ankle at home and she verbalized understanding.     Rafia Is progressing well towards her goals.   Pt prognosis is Good.     Pt will  continue to benefit from skilled outpatient physical therapy to address the deficits listed in the problem list box on initial evaluation, provide pt/family education and to maximize pt's level of independence in the home and community environment.     Pt's spiritual, cultural and educational needs considered and pt agreeable to plan of care and goals.     Anticipated barriers to physical therapy: patient reports LE nerve sensations with burning possibly indicative of neuropathy    Goals:  Short Term Goals: 3 weeks   1. Patient will be independent with HEP for therapy carryover     Long Term Goals: 5 weeks   1. Patient will report pain of 4/10 with work and household activities  2. Patient will demonstrate ability to ambulate without deviation on level surfaces.  3.  Patient will tolerate 30 min of therapy without significant rest to facilitate return to work     Plan     Ice massage, KT tape, ROM exercises      Maite Hathaway, PT  4/18/2022

## 2022-04-18 NOTE — PLAN OF CARE
[facility] Reass[facility]    Name: Rafia Singh  [facility] Number: [id number]    Therapy Diagnosis: muscle weakness, difficulty walking  Physician: Antonio Squires MD    Visit Date: 4/18/2022    Physician Orders: PT Eval and Treat   Medical Diagnosis from Referral: left ankle peroneal tendinitis       Evaluation Date: 3/10/2022    PT Subjective: Patient reports overall improvement in left ankle pain from at worst 10 at initial evaluation to 7 at worst currently.      Progress toward goals:    Goals:  Short Term Goals: 3 weeks   1. Patient will be independent with HEP for therapy carryover. (MET, will update as indicated)     Long Term Goals: 5 weeks   1. Patient will report pain of 4/10 with work and household activities.  (ongoing, progressing Patient reports highest pain 7/10 and lowest as 4/10).  2. Patient will demonstrate ability to ambulate without deviation on level surfaces (ongoing, progressing  Patient continues to wear left walking boot with decreased stance time for LLE).   3.  Patient will tolerate 30 min of therapy without significant rest to facilitate return to work (ongoing, progressing  Patient reports she continues to be unable to work as a .)        Reasons for Recertification of Therapy: muscle weakness, difficulty walking, left ankle pain, impaired balance/coordination     Plan     Updated Certification Period: 4/18/2022 to 5/16/22  Recommended Treatment Plan: 2 times per week for 4 weeks: Gait Training, Manual Therapy, Moist Heat/ Ice, Neuromuscular Re-ed, Patient Education, Therapeutic Activities, Therapeutic Exercise, Ultrasound and kinesiotaping  Other Recommendations:     Maite Hathaway, PT  4/18/2022

## 2022-04-21 ENCOUNTER — CLINICAL SUPPORT (OUTPATIENT)
Dept: REHABILITATION | Facility: HOSPITAL | Age: 67
End: 2022-04-21
Payer: MEDICARE

## 2022-04-21 DIAGNOSIS — M76.72 PERONEAL TENDINITIS OF LEFT LOWER LEG: Primary | ICD-10-CM

## 2022-04-21 PROCEDURE — 97140 MANUAL THERAPY 1/> REGIONS: CPT | Mod: CQ

## 2022-04-21 PROCEDURE — 97010 HOT OR COLD PACKS THERAPY: CPT | Mod: CQ

## 2022-04-21 PROCEDURE — 97110 THERAPEUTIC EXERCISES: CPT | Mod: CQ

## 2022-04-21 NOTE — PROGRESS NOTES
Physical Therapy Treatment Note     Name: Rafia Singh  [facility] Number: [id number]    Therapy Diagnosis:   Encounter Diagnosis   Name Primary?    Peroneal tendinitis of left lower leg Yes     Physician: Antonio Squires MD    Visit Date: 4/21/2022    Physician Orders: PT Eval and Treat   Medical Diagnosis from Referral: left ankle peroneal tendinitis       Evaluation Date: 3/10/2022  Progress Report due 4/14/22  Plan of Care Expiration: 4/15/22  Visit # / Visits authorized: 12/ 19  PTA Visit #: 1    Time In: 0800  Time Out: 0904  Total Billable Time: 64 minutes    Precautions: Standard       Subjective     Pt reports: she is about a 4/10 today   She was compliant with home exercise program.    Pain: 4/10  Location: left foot      Objective     Rafia received therapeutic exercises to develop strength and ROM for 48 minutes including:  NuStep x 8 min  4 way ankle with band: red x 30  Towel scrunches: x 30     Rafia received the following manual therapy techniques: Myofacial release and PROM stretching were applied to the: left ankle for 8 minutes, including:  MFR to left ankle with biofreeze  PF/DF stretch 6 x 30 sec holds    Rafia received ice massage for 8 minutes on right ankle.     Home Exercises Provided and Patient Education Provided     Education provided: on eval    Written Home Exercises Provided: Patient instructed to cont prior HEP.  Exercises were reviewed and Rafia was able to demonstrate them prior to the end of the session.  Rafia demonstrated good  understanding of the education provided.     See EMR under Patient Instructions for exercises provided 3/10/2022.    Assessment     Patient able to perform all exercises with no increase in pain. Patient reports her pain is better following treatment today. Patient is able to hold conversation throughout treatment and needs redirection in order to complete task.     Rafia Is progressing well towards her goals.   Pt prognosis is Good.     Pt will  continue to benefit from skilled outpatient physical therapy to address the deficits listed in the problem list box on initial evaluation, provide pt/family education and to maximize pt's level of independence in the home and community environment.     Pt's spiritual, cultural and educational needs considered and pt agreeable to plan of care and goals.     Anticipated barriers to physical therapy: patient reports LE nerve sensations with burning possibly indicative of neuropathy    Goals:  Short Term Goals: 3 weeks   1. Patient will be independent with HEP for therapy carryover - met      Long Term Goals: 5 weeks   1. Patient will report pain of 4/10 with work and household activities - met  2. Patient will demonstrate ability to ambulate without deviation on level surfaces.  3.  Patient will tolerate 30 min of therapy without significant rest to facilitate return to work - met    Plan     Will continue with POC as appropriate.    Soledad Gibson, PTA  4/21/2022

## 2022-04-26 ENCOUNTER — CLINICAL SUPPORT (OUTPATIENT)
Dept: REHABILITATION | Facility: HOSPITAL | Age: 67
End: 2022-04-26
Payer: MEDICARE

## 2022-04-26 DIAGNOSIS — M76.72 PERONEAL TENDINITIS OF LEFT LOWER LEG: Primary | ICD-10-CM

## 2022-04-26 PROCEDURE — 97140 MANUAL THERAPY 1/> REGIONS: CPT | Mod: CQ

## 2022-04-26 PROCEDURE — 97110 THERAPEUTIC EXERCISES: CPT | Mod: CQ

## 2022-04-26 PROCEDURE — 97010 HOT OR COLD PACKS THERAPY: CPT | Mod: CQ

## 2022-04-26 NOTE — PROGRESS NOTES
Physical Therapy Treatment Note     Name: Rafia Singh  Clinic Number: [id number]    Therapy Diagnosis:   No diagnosis found.  Physician: Antonio Squires MD    Visit Date: 4/26/2022    Physician Orders: PT Eval and Treat   Medical Diagnosis from Referral: left ankle peroneal tendinitis       Evaluation Date: 3/10/2022  Progress Report due 4/14/22  Plan of Care Expiration: 4/15/22  Visit # / Visits authorized: 13/ 19  PTA Visit #: 2    Time In: 1448  Time Out: 1530  Total Billable Time: 42 minutes    Precautions: Standard       Subjective     Pt reports: her ankle is bothering her today   She was compliant with home exercise program.    Pain: 5/10  Location: left foot      Objective     Rafia received therapeutic exercises to develop strength and ROM for 24 minutes including:  NuStep x 6 min  4 way ankle with band: green x 30  Towel scrunches: x 30   Seated heel raises 2 x 10 (not this visit)      Rafia received the following manual therapy techniques: Myofacial release and PROM stretching were applied to the: left ankle for 10 minutes, including:  MFR to left ankle with biofreeze  PF/DF stretch x 30 sec holds    Rafia received ice pack for 8 minutes on right ankle.     Home Exercises Provided and Patient Education Provided     Education provided: on eval    Written Home Exercises Provided: Patient instructed to cont prior HEP.  Exercises were reviewed and Rafia was able to demonstrate them prior to the end of the session.  Rafia demonstrated good  understanding of the education provided.     See EMR under Patient Instructions for exercises provided 3/10/2022.    Assessment     Patient able to perform all exercises with no increase in pain. Patient reports her pain is better following treatment today.    Rafia Is progressing well towards her goals.   Pt prognosis is Good.     Pt will continue to benefit from skilled outpatient physical therapy to address the deficits listed in the problem list box on initial  evaluation, provide pt/family education and to maximize pt's level of independence in the home and community environment.     Pt's spiritual, cultural and educational needs considered and pt agreeable to plan of care and goals.     Anticipated barriers to physical therapy: patient reports LE nerve sensations with burning possibly indicative of neuropathy    Goals:  Short Term Goals: 3 weeks   1. Patient will be independent with HEP for therapy carryover - met      Long Term Goals: 5 weeks   1. Patient will report pain of 4/10 with work and household activities - met  2. Patient will demonstrate ability to ambulate without deviation on level surfaces.  3.  Patient will tolerate 30 min of therapy without significant rest to facilitate return to work - met    Plan     Will continue with POC as appropriate.    Tony Mann, PTA  4/26/2022

## 2022-04-27 ENCOUNTER — CLINICAL SUPPORT (OUTPATIENT)
Dept: REHABILITATION | Facility: HOSPITAL | Age: 67
End: 2022-04-27
Payer: MEDICARE

## 2022-04-27 DIAGNOSIS — M76.72 PERONEAL TENDINITIS OF LEFT LOWER LEG: Primary | ICD-10-CM

## 2022-04-27 PROCEDURE — 97140 MANUAL THERAPY 1/> REGIONS: CPT | Mod: CQ

## 2022-04-27 PROCEDURE — 97110 THERAPEUTIC EXERCISES: CPT | Mod: CQ

## 2022-04-27 NOTE — PROGRESS NOTES
Physical Therapy Treatment Note     Name: Rafia Singh  Clinic Number: [id number]    Therapy Diagnosis:   No diagnosis found.  Physician: Antonio Squires MD    Visit Date: 4/27/2022    Physician Orders: PT Eval and Treat   Medical Diagnosis from Referral: left ankle peroneal tendinitis       Evaluation Date: 3/10/2022  Progress Report due 4/14/22  Plan of Care Expiration: 4/15/22  Visit # / Visits authorized: 14/ 19  PTA Visit #: 3    Time In: 1531 (pt tx time started prior to being checked in)  Time Out: 1606  Total Billable Time: 35 minutes    Precautions: Standard       Subjective     Pt reports: she had to ice her ankle this morning after waking up but that it has felt better since  She was compliant with home exercise program.    Pain: 5/10  Location: left foot      Objective     Rafia received therapeutic exercises to develop strength and ROM for 24 minutes including:  NuStep x 6 min  4 way ankle with band: green x 30  Towel scrunches: x 30   Seated heel raises 2 x 10      Rafia received the following manual therapy techniques: Myofacial release and PROM stretching were applied to the: left ankle for 10 minutes, including:  MFR to left ankle with biofreeze  PF/DF stretch x 30 sec holds    Rafia received ice pack for 0 minutes on right ankle. (patient to ice at home)    Home Exercises Provided and Patient Education Provided     Education provided: on eval    Written Home Exercises Provided: Patient instructed to cont prior HEP.  Exercises were reviewed and Rafia was able to demonstrate them prior to the end of the session.  Rafia demonstrated good  understanding of the education provided.     See EMR under Patient Instructions for exercises provided 3/10/2022.    Assessment     Patient able to perform all exercises with no increase in pain.    Rafia Is progressing well towards her goals.   Pt prognosis is Good.     Pt will continue to benefit from skilled outpatient physical therapy to address the  deficits listed in the problem list box on initial evaluation, provide pt/family education and to maximize pt's level of independence in the home and community environment.     Pt's spiritual, cultural and educational needs considered and pt agreeable to plan of care and goals.     Anticipated barriers to physical therapy: patient reports LE nerve sensations with burning possibly indicative of neuropathy    Goals:  Short Term Goals: 3 weeks   1. Patient will be independent with HEP for therapy carryover - met      Long Term Goals: 5 weeks   1. Patient will report pain of 4/10 with work and household activities - met  2. Patient will demonstrate ability to ambulate without deviation on level surfaces.  3.  Patient will tolerate 30 min of therapy without significant rest to facilitate return to work - met    Plan     Will continue with POC as appropriate.    Tony Mann, PTA  4/27/2022

## 2022-05-03 ENCOUNTER — CLINICAL SUPPORT (OUTPATIENT)
Dept: REHABILITATION | Facility: HOSPITAL | Age: 67
End: 2022-05-03
Payer: MEDICARE

## 2022-05-03 DIAGNOSIS — M76.72 PERONEAL TENDINITIS OF LEFT LOWER LEG: Primary | ICD-10-CM

## 2022-05-03 PROCEDURE — 97140 MANUAL THERAPY 1/> REGIONS: CPT | Mod: CQ

## 2022-05-03 PROCEDURE — 97110 THERAPEUTIC EXERCISES: CPT | Mod: CQ

## 2022-05-03 NOTE — PROGRESS NOTES
Physical Therapy Treatment Note     Name: Rafia Singh  [facility] Number: [id number]    Therapy Diagnosis:   No diagnosis found.  Physician: Antonio Squires MD    Visit Date: 5/3/2022    Physician Orders: PT Eval and Treat   Medical Diagnosis from Referral: left ankle peroneal tendinitis       Evaluation Date: 3/10/2022  Progress Report due 5/18/22  Plan of Care Expiration: 5/16/22  Visit # / Visits authorized: 15/ 19  PTA Visit #: 4    Time In: 0927  Time Out: 1004  Total Billable Time: 37 minutes    Precautions: Standard       Subjective     Pt reports: her ankle had a burning sensation and swelling over the weekend  She was compliant with home exercise program.    Pain: 5/10  Location: left foot      Objective     Rafia received therapeutic exercises to develop strength and ROM for 25 minutes including:  NuStep x 6 min  4 way ankle with band: green x 30  Towel scrunches: x 30 (per HEP)  Seated heel raises 2 x 10 (per HEP)      Rafia received the following manual therapy techniques: Myofacial release and PROM stretching were applied to the: left ankle for 12 minutes, including:  STM to left ankle with biofreeze  PF/DF stretch x 30 sec holds    Rafia received ice pack for 0 minutes on right ankle. (patient to ice at home)    Home Exercises Provided and Patient Education Provided     Education provided: updated HEP    Written Home Exercises Provided: yes.  Exercises were reviewed and Rafia was able to demonstrate them prior to the end of the session.  Rafia demonstrated good  understanding of the education provided.     See EMR under below for exercises provided 5/3/22.    Assessment     Patient able to perform all exercises with no increase in pain. Patient stated her ankle felt better after tx.    Rafia Is progressing well towards her goals.   Pt prognosis is Good.     Pt will continue to benefit from skilled outpatient physical therapy to address the deficits listed in the problem list box on initial  evaluation, provide pt/family education and to maximize pt's level of independence in the home and community environment.     Pt's spiritual, cultural and educational needs considered and pt agreeable to plan of care and goals.     Anticipated barriers to physical therapy: patient reports LE nerve sensations with burning possibly indicative of neuropathy    Goals:  Short Term Goals: 3 weeks   1. Patient will be independent with HEP for therapy carryover - met      Long Term Goals: 5 weeks   1. Patient will report pain of 4/10 with work and household activities - met  2. Patient will demonstrate ability to ambulate without deviation on level surfaces.  3.  Patient will tolerate 30 min of therapy without significant rest to facilitate return to work - met    Plan     Will continue with POC as appropriate.    Tony Mann, PTA  5/3/2022

## 2022-05-10 ENCOUNTER — CLINICAL SUPPORT (OUTPATIENT)
Dept: REHABILITATION | Facility: HOSPITAL | Age: 67
End: 2022-05-10
Payer: MEDICARE

## 2022-05-10 DIAGNOSIS — M76.72 PERONEAL TENDINITIS OF LEFT LOWER LEG: Primary | ICD-10-CM

## 2022-05-10 PROCEDURE — 97110 THERAPEUTIC EXERCISES: CPT

## 2022-05-10 NOTE — PROGRESS NOTES
Physical Therapy Treatment Note     Name: Rafia Singh  [facility] Number: [id number]    Therapy Diagnosis:   No diagnosis found.  Physician: Antonio Squires MD    Visit Date: 5/10/2022    Physician Orders: PT Eval and Treat   Medical Diagnosis from Referral: left ankle peroneal tendinitis       Evaluation Date: 3/10/2022  Progress Report due 5/18/22  Plan of Care Expiration: 5/16/22  Visit # / Visits authorized: 16/ 19  PTA Visit #: 0    Time In: 0930  Time Out: 1013  Total Billable Time: 43 minutes    Precautions: Standard       Subjective     Pt reports: her ankle is burning especially in the evenings when she is resting.   She was compliant with home exercise program.    Pain: 3/10   Location: left foot      Objective     Rafia received therapeutic exercises to develop strength and ROM for 32 minutes including:  NuStep x 6 min  Slant board stretch x 30 seconds  4 way ankle with band: green x 30  BAPS CW and CCW circles with #2 ball x 20 each direction  BAPS taps A-P and lat x 20 each      Rafia received the following manual therapy techniques: Myofacial release and PROM stretching were applied to the: left ankle for 0 minutes, including:  STM to left ankle with biofreeze (not this visit)  PF/DF stretch x 30 sec holds ( not this visit)     Rafia received ice massage for 5 minutes on right ankle.     Home Exercises Provided and Patient Education Provided     Education provided: updated POC    Written Home Exercises Provided: yes.  Exercises were reviewed and Rafia was able to demonstrate them prior to the end of the session.  Rafia demonstrated good  understanding of the education provided.     See EMR under below for exercises provided 5/3/22.    Assessment     Patient able to perform all exercises with no increase in pain. Patient stated her ankle felt better after tx. I am concerned that some of her pain is partially attributed to neuropathy due to her history of 3 years of diabetes and her symptoms are not  necessarily based on activity and ankle use.      Rafia Is progressing well towards her goals.   Pt prognosis is Good.     Pt will continue to benefit from skilled outpatient physical therapy to address the deficits listed in the problem list box on initial evaluation, provide pt/family education and to maximize pt's level of independence in the home and community environment.     Pt's spiritual, cultural and educational needs considered and pt agreeable to plan of care and goals.     Anticipated barriers to physical therapy: patient reports LE nerve sensations with burning possibly indicative of neuropathy    Goals:  Short Term Goals: 3 weeks   1. Patient will be independent with [facility] for therapy carryover - met      Long Term Goals: 5 weeks   1. Patient will report pain of 4/10 with work and household activities - met  2. Patient will demonstrate ability to ambulate without deviation on level surfaces. - Patient is ambulating with a walking boot and is walking as normal as possible with the use of the boot.    3.  Patient will tolerate 30 min of therapy without significant rest to facilitate return to work - met    Plan     Will continue with Plan of care for one additional visit then discharge to [facility] if no further orders are received from the MD.     BENI ARAYA, PT, ATP  5/10/2022

## 2022-05-11 NOTE — PLAN OF CARE
Physical Therapy Plan of Care Update Note      Name: Rafia Singh  [facility] Number: [id number]     Therapy Diagnosis:   No diagnosis found.  Physician: Antonio Squires MD     Visit Date: 5/10/2022     Physician Orders: PT Eval and Treat   Medical Diagnosis from Referral: left ankle peroneal tendinitis       Evaluation Date: 3/10/2022  Progress Report due 5/18/22  Plan of Care Expiration: 5/16/22  Visit # / Visits authorized: 16/ 19    Subjective      Pt reports: her ankle is burning especially in the evenings when she is resting.   She was compliant with home exercise program.     Pain: 3/10   Location: left foot       Objective      She has received 16 visits thus far of LE strengthening, stretching, modalities for tendonitis including ultrasound, ice, and manual therapy as well as gait training and neuromuscular rebecca.   She is now reporting symptoms in the left ankle of burning at rest and these complaints are similar to a neuropathy presentation as they are not worsened by ambulation.     Assessment      Patient able to perform all exercises with no increase in pain. Patient stated her ankle felt better after tx. I am concerned that some of her pain is partially attributed to neuropathy due to her history of 3 years of diabetes and her symptoms are not necessarily based on activity and ankle use.       Rafia Is progressing well towards her goals.     Goals:  Short Term Goals: 3 weeks   1. Patient will be independent with HEP for therapy carryover - met      Long Term Goals: 5 weeks   1. Patient will report pain of 4/10 with work and household activities - met  2. Patient will demonstrate ability to ambulate without deviation on level surfaces. - Patient is ambulating with a walking boot and is walking as normal as possible with the use of the boot.    3.  Patient will tolerate 30 min of therapy without significant rest to facilitate return to work - met     Plan      Will continue with Plan of care for one  additional visit then discharge to [facility] if no further changes orders are received from the MD.      BENI ARAYA, PT, ATP  5/10/2022

## 2022-05-12 ENCOUNTER — CLINICAL SUPPORT (OUTPATIENT)
Dept: REHABILITATION | Facility: HOSPITAL | Age: 67
End: 2022-05-12
Payer: MEDICARE

## 2022-05-12 DIAGNOSIS — M76.72 PERONEAL TENDINITIS OF LEFT LOWER LEG: Primary | ICD-10-CM

## 2022-05-12 PROCEDURE — 97110 THERAPEUTIC EXERCISES: CPT | Mod: CQ

## 2022-05-12 NOTE — PROGRESS NOTES
"  Physical Therapy Treatment Note     Name: Rafia Singh  [facility] Number: [id number]    Therapy Diagnosis:   No diagnosis found.  Physician: Antonio Squires MD    Visit Date: 5/12/2022    Physician Orders: PT Eval and Treat   Medical Diagnosis from Referral: left ankle peroneal tendinitis       Evaluation Date: 3/10/2022  Progress Report due 5/18/22  Plan of Care Expiration: 5/16/22  Visit # / Visits authorized: 17/ 19  PTA Visit #: 1    Time In: 1445  Time Out: 1525  Total Billable Time: 40 minutes    Precautions: Standard       Subjective     Pt reports: "it is better than it has been"  She was compliant with home exercise program.    Pain: 2/10   Location: left foot      Objective     Rafia received therapeutic exercises to develop strength and ROM for 40 minutes including:  NuStep x 6 min  Slant board stretch x 30 seconds  4 way ankle with band: green x 30  BAPS CW and CCW circles with #2 ball x 20 each direction  BAPS taps A-P and lat x 20 each    Rafia received ice massage for 0 minutes on right ankle.     Home Exercises Provided and Patient Education Provided     Education provided:     Written Home Exercises Provided: Patient instructed to cont prior HEP.  Exercises were reviewed and Rafia was able to demonstrate them prior to the end of the session.  Rafia demonstrated good  understanding of the education provided.     See EMR under below for exercises provided 5/3/22.    Assessment     Patient able to perform all exercises with no increase in pain. Patient stated she would just ice at home.     Rafia Is progressing well towards her goals.   Pt prognosis is Good.     Pt will continue to benefit from skilled outpatient physical therapy to address the deficits listed in the problem list box on initial evaluation, provide pt/family education and to maximize pt's level of independence in the home and community environment.     Pt's spiritual, cultural and educational needs considered and pt agreeable to " plan of care and goals.     Anticipated barriers to physical therapy: patient reports LE nerve sensations with burning possibly indicative of neuropathy    Goals:  Short Term Goals: 3 weeks   1. Patient will be independent with HEP for therapy carryover - met      Long Term Goals: 5 weeks   1. Patient will report pain of 4/10 with work and household activities - met  2. Patient will demonstrate ability to ambulate without deviation on level surfaces. - Patient is ambulating with a walking boot and is walking as normal as possible with the use of the boot.    3.  Patient will tolerate 30 min of therapy without significant rest to facilitate return to work - met    Plan     Will continue with Plan of care then discharge to [facility] if no further orders are received from the MD.     Soledad Gibson, PTA  5/12/2022

## 2022-05-16 ENCOUNTER — CLINICAL SUPPORT (OUTPATIENT)
Dept: REHABILITATION | Facility: HOSPITAL | Age: 67
End: 2022-05-16
Payer: MEDICARE

## 2022-05-16 DIAGNOSIS — M76.72 PERONEAL TENDINITIS OF LEFT LOWER LEG: Primary | ICD-10-CM

## 2022-05-16 PROCEDURE — 97110 THERAPEUTIC EXERCISES: CPT | Mod: CQ

## 2022-05-16 NOTE — PROGRESS NOTES
Physical Therapy Treatment Note     Name: Rafia Signh  [facility] Number: [id number]    Therapy Diagnosis:   Encounter Diagnosis   Name Primary?    Peroneal tendinitis of left lower leg Yes     Physician: Antonio Squires MD    Visit Date: 5/16/2022    Physician Orders: PT Eval and Treat   Medical Diagnosis from Referral: left ankle peroneal tendinitis       Evaluation Date: 3/10/2022  Progress Report due 5/18/22  Plan of Care Expiration: 5/16/22  Visit # / Visits authorized: 18/ 19  PTA Visit #: 2    Time In: 1537  Time Out: 1604  Total Billable Time: 27 minutes    Precautions: Standard       Subjective     Pt reports: she went to see her MD today and he wants her to finish up with therapy and then he is going to send her to see a Neurologist.  She was compliant with home exercise program.    Pain: 2/10   Location: left foot      Objective     Rafia received therapeutic exercises to develop strength and ROM for 27 minutes including:  NuStep x 6 min  Slant board stretch x 30 seconds  4 way ankle with band: green x 30  BAPS CW and CCW circles with #2 ball x 20 each direction  BAPS taps A-P and lat x 20 each    Rafia received ice massage for 0 minutes on right ankle.     Home Exercises Provided and Patient Education Provided     Education provided:     Written Home Exercises Provided: Patient instructed to cont prior HEP.  Exercises were reviewed and Rafia was able to demonstrate them prior to the end of the session.  Rafia demonstrated good  understanding of the education provided.     See EMR under below for exercises provided 5/3/22.    Assessment     Patient able to perform all exercises with no increase in pain. Patient stated she would just ice at home. Patient stated she saw her MD today he suggested she finish her last two visits with therapy and he would refer her to a Neurologist.    Rafia Is progressing well towards her goals.   Pt prognosis is Good.     Pt will continue to benefit from skilled  outpatient physical therapy to address the deficits listed in the problem list box on initial evaluation, provide pt/family education and to maximize pt's level of independence in the home and community environment.     Pt's spiritual, cultural and educational needs considered and pt agreeable to plan of care and goals.     Anticipated barriers to physical therapy: patient reports LE nerve sensations with burning possibly indicative of neuropathy    Goals:  Short Term Goals: 3 weeks   1. Patient will be independent with [facility] for therapy carryover - met      Long Term Goals: 5 weeks   1. Patient will report pain of 4/10 with work and household activities - met  2. Patient will demonstrate ability to ambulate without deviation on level surfaces. - Patient is ambulating with a walking boot and is walking as normal as possible with the use of the boot.    3.  Patient will tolerate 30 min of therapy without significant rest to facilitate return to work - met    Plan     Will continue with Plan of care then discharge to [facility] next visit     Tony Mann, PTA  5/16/2022

## 2022-05-18 ENCOUNTER — CLINICAL SUPPORT (OUTPATIENT)
Dept: REHABILITATION | Facility: HOSPITAL | Age: 67
End: 2022-05-18
Payer: MEDICARE

## 2022-05-18 DIAGNOSIS — M76.72 PERONEAL TENDINITIS OF LEFT LOWER LEG: Primary | ICD-10-CM

## 2022-05-18 PROCEDURE — 97110 THERAPEUTIC EXERCISES: CPT

## 2022-05-18 NOTE — PLAN OF CARE
SEE BELOW FOR DC NOTE       Physical Therapy DIscharge Note     Name: Rafia Singh  Clinic Number: [id number]    Therapy Diagnosis:   Encounter Diagnosis   Name Primary?    Peroneal tendinitis of left lower leg Yes     Physician: Antonio Squires MD    Visit Date: 5/18/2022    Physician Orders: PT Eval and Treat   Medical Diagnosis from Referral: left ankle peroneal tendinitis       Evaluation Date: 3/10/2022  Progress Report due 5/18/22  Plan of Care Expiration: 5/16/22  Visit # / Visits authorized: 19/ 19  PTA Visit #: 2    Time In: 1:55 PM   Time Out: 2:24 PM  Total Billable Time: 29 minutes    Precautions: Standard       Subjective     Pt reports: Patient is doing to DC charge today; she will follow up with PCP and Neurologist on plan to see if she has neuropathy and or to have a possible ankle surgery.     She was compliant with home exercise program.    Pain: 2/10   Location: left foot      Objective     Rafia received therapeutic exercises to develop strength and ROM for 27 minutes including:    NuStep x 6 min  Slant board stretch x 30 seconds  4 way ankle with band: green x 30  BAPS CW and CCW circles with #2 ball x 20 each direction  BAPS taps A-P and lat x 30 each    Rafia received ice massage for 0 minutes on right ankle.     Home Exercises Provided and Patient Education Provided     Education provided:     Written Home Exercises Provided: Patient instructed to cont prior HEP.  Exercises were reviewed and Rafia was able to demonstrate them prior to the end of the session.  Rafia demonstrated good  understanding of the education provided.     See EMR under below for exercises provided 5/3/22.    Assessment     Rafia Singh present to physical therapy today for final assessment an discharge.  Rafia has met all goals set at initial evaluation, unless listed below, and will continue to work at home towards personal goal(s) at of: continue with work on HEP in order to ambulate better.  Rafia is  independent with home exercise program and was given handouts throughout this episode of care to reference for continued wellness and physical fitness . Contact information was given to patient in case any questions arise in the future or if therapy is needed.      Discharge reason: Patient has reached the maximum rehab potential for the present time and Patient has completed allowable visits authorized by insurance    Discharge FOTO Score: 41    Pt will continue to benefit from skilled outpatient physical therapy to address the deficits listed in the problem list box on initial evaluation, provide pt/family education and to maximize pt's level of independence in the home and community environment.     Pt's spiritual, cultural and educational needs considered and pt agreeable to plan of care and goals.     Anticipated barriers to physical therapy: patient reports LE nerve sensations with burning possibly indicative of neuropathy    Goals:  Short Term Goals: 3 weeks   1. Patient will be independent with [facility] for therapy carryover - met      Long Term Goals: 5 weeks   1. Patient will report pain of 4/10 with work and household activities - met  2. Patient will demonstrate ability to ambulate without deviation on level surfaces. - Patient is ambulating with a walking boot and is walking as normal as possible with the use of the boot.  - Met  3.  Patient will tolerate 30 min of therapy without significant rest to facilitate return to work - met    Plan     Will continue with Plan of care then discharge to [facility] next visit     Juan Swift, PT  5/18/2022

## 2022-05-18 NOTE — PROGRESS NOTES
Physical Therapy DIscharge Note     Name: Rafia Singh  Clinic Number: [id number]    Therapy Diagnosis:   Encounter Diagnosis   Name Primary?    Peroneal tendinitis of left lower leg Yes     Physician: Antonio Squires MD    Visit Date: 5/18/2022    Physician Orders: PT Eval and Treat   Medical Diagnosis from Referral: left ankle peroneal tendinitis       Evaluation Date: 3/10/2022  Progress Report due 5/18/22  Plan of Care Expiration: 5/16/22  Visit # / Visits authorized: 19/ 19  PTA Visit #: 2    Time In: 1:55 PM   Time Out: 2:24 PM  Total Billable Time: 29 minutes    Precautions: Standard       Subjective     Pt reports: Patient is doing to DC charge today; she will follow up with PCP and Neurologist on plan to see if she has neuropathy and or to have a possible ankle surgery.     She was compliant with home exercise program.    Pain: 2/10   Location: left foot      Objective     Rafia received therapeutic exercises to develop strength and ROM for 27 minutes including:    NuStep x 6 min  Slant board stretch x 30 seconds  4 way ankle with band: green x 30  BAPS CW and CCW circles with #2 ball x 20 each direction  BAPS taps A-P and lat x 30 each    Rafia received ice massage for 0 minutes on right ankle.     Home Exercises Provided and Patient Education Provided     Education provided:     Written Home Exercises Provided: Patient instructed to cont prior HEP.  Exercises were reviewed and Rafia was able to demonstrate them prior to the end of the session.  Rafia demonstrated good  understanding of the education provided.     See EMR under below for exercises provided 5/3/22.    Assessment     Rafia Singh present to physical therapy today for final assessment an discharge.  Rafia has met all goals set at initial evaluation, unless listed below, and will continue to work at home towards personal goal(s) at of: continue with work on HEP in order to ambulate better.  Rafia is independent with home  exercise program and was given handouts throughout this episode of care to reference for continued wellness and physical fitness . Contact information was given to patient in case any questions arise in the future or if therapy is needed.      Discharge reason: Patient has reached the maximum rehab potential for the present time and Patient has completed allowable visits authorized by insurance    Discharge FOTO Score: 41    Pt will continue to benefit from skilled outpatient physical therapy to address the deficits listed in the problem list box on initial evaluation, provide pt/family education and to maximize pt's level of independence in the home and community environment.     Pt's spiritual, cultural and educational needs considered and pt agreeable to plan of care and goals.     Anticipated barriers to physical therapy: patient reports LE nerve sensations with burning possibly indicative of neuropathy    Goals:  Short Term Goals: 3 weeks   1. Patient will be independent with [facility] for therapy carryover - met      Long Term Goals: 5 weeks   1. Patient will report pain of 4/10 with work and household activities - met  2. Patient will demonstrate ability to ambulate without deviation on level surfaces. - Patient is ambulating with a walking boot and is walking as normal as possible with the use of the boot.  - Met  3.  Patient will tolerate 30 min of therapy without significant rest to facilitate return to work - met    Plan     Will continue with Plan of care then discharge to [facility] next visit     Juan Swift, PT  5/18/2022

## 2022-06-09 ENCOUNTER — OFFICE VISIT (OUTPATIENT)
Dept: NEUROLOGY | Facility: CLINIC | Age: 67
End: 2022-06-09
Payer: MEDICARE

## 2022-06-09 VITALS
OXYGEN SATURATION: 97 % | HEIGHT: 64 IN | SYSTOLIC BLOOD PRESSURE: 124 MMHG | DIASTOLIC BLOOD PRESSURE: 80 MMHG | HEART RATE: 69 BPM | BODY MASS INDEX: 32.01 KG/M2 | WEIGHT: 187.5 LBS

## 2022-06-09 DIAGNOSIS — M25.572 LEFT ANKLE PAIN, UNSPECIFIED CHRONICITY: ICD-10-CM

## 2022-06-09 DIAGNOSIS — G62.9 PERIPHERAL POLYNEUROPATHY: Primary | ICD-10-CM

## 2022-06-09 PROCEDURE — 1160F RVW MEDS BY RX/DR IN RCRD: CPT | Mod: CPTII,,, | Performed by: PSYCHIATRY & NEUROLOGY

## 2022-06-09 PROCEDURE — 99203 OFFICE O/P NEW LOW 30 MIN: CPT | Mod: S$PBB,,, | Performed by: PSYCHIATRY & NEUROLOGY

## 2022-06-09 PROCEDURE — 4010F ACE/ARB THERAPY RXD/TAKEN: CPT | Mod: CPTII,,, | Performed by: PSYCHIATRY & NEUROLOGY

## 2022-06-09 PROCEDURE — 1159F MED LIST DOCD IN RCRD: CPT | Mod: CPTII,,, | Performed by: PSYCHIATRY & NEUROLOGY

## 2022-06-09 PROCEDURE — 1159F PR MEDICATION LIST DOCUMENTED IN MEDICAL RECORD: ICD-10-PCS | Mod: CPTII,,, | Performed by: PSYCHIATRY & NEUROLOGY

## 2022-06-09 PROCEDURE — 3074F PR MOST RECENT SYSTOLIC BLOOD PRESSURE < 130 MM HG: ICD-10-PCS | Mod: CPTII,,, | Performed by: PSYCHIATRY & NEUROLOGY

## 2022-06-09 PROCEDURE — 3008F BODY MASS INDEX DOCD: CPT | Mod: CPTII,,, | Performed by: PSYCHIATRY & NEUROLOGY

## 2022-06-09 PROCEDURE — 3288F PR FALLS RISK ASSESSMENT DOCUMENTED: ICD-10-PCS | Mod: CPTII,,, | Performed by: PSYCHIATRY & NEUROLOGY

## 2022-06-09 PROCEDURE — 4010F PR ACE/ARB THEARPY RXD/TAKEN: ICD-10-PCS | Mod: CPTII,,, | Performed by: PSYCHIATRY & NEUROLOGY

## 2022-06-09 PROCEDURE — 3079F PR MOST RECENT DIASTOLIC BLOOD PRESSURE 80-89 MM HG: ICD-10-PCS | Mod: CPTII,,, | Performed by: PSYCHIATRY & NEUROLOGY

## 2022-06-09 PROCEDURE — 3288F FALL RISK ASSESSMENT DOCD: CPT | Mod: CPTII,,, | Performed by: PSYCHIATRY & NEUROLOGY

## 2022-06-09 PROCEDURE — 1160F PR REVIEW ALL MEDS BY PRESCRIBER/CLIN PHARMACIST DOCUMENTED: ICD-10-PCS | Mod: CPTII,,, | Performed by: PSYCHIATRY & NEUROLOGY

## 2022-06-09 PROCEDURE — 3074F SYST BP LT 130 MM HG: CPT | Mod: CPTII,,, | Performed by: PSYCHIATRY & NEUROLOGY

## 2022-06-09 PROCEDURE — 99215 OFFICE O/P EST HI 40 MIN: CPT | Mod: PBBFAC | Performed by: PSYCHIATRY & NEUROLOGY

## 2022-06-09 PROCEDURE — 1101F PR PT FALLS ASSESS DOC 0-1 FALLS W/OUT INJ PAST YR: ICD-10-PCS | Mod: CPTII,,, | Performed by: PSYCHIATRY & NEUROLOGY

## 2022-06-09 PROCEDURE — 1101F PT FALLS ASSESS-DOCD LE1/YR: CPT | Mod: CPTII,,, | Performed by: PSYCHIATRY & NEUROLOGY

## 2022-06-09 PROCEDURE — 99203 PR OFFICE/OUTPT VISIT, NEW, LEVL III, 30-44 MIN: ICD-10-PCS | Mod: S$PBB,,, | Performed by: PSYCHIATRY & NEUROLOGY

## 2022-06-09 PROCEDURE — 3079F DIAST BP 80-89 MM HG: CPT | Mod: CPTII,,, | Performed by: PSYCHIATRY & NEUROLOGY

## 2022-06-09 PROCEDURE — 3008F PR BODY MASS INDEX (BMI) DOCUMENTED: ICD-10-PCS | Mod: CPTII,,, | Performed by: PSYCHIATRY & NEUROLOGY

## 2022-06-09 RX ORDER — VIT C/E/ZN/COPPR/LUTEIN/ZEAXAN 250MG-90MG
CAPSULE ORAL
COMMUNITY

## 2022-06-09 RX ORDER — BLOOD-GLUCOSE METER
EACH MISCELLANEOUS
COMMUNITY
Start: 2022-03-23

## 2022-06-09 RX ORDER — GABAPENTIN 300 MG/1
CAPSULE ORAL
Qty: 270 CAPSULE | Refills: 3 | Status: SHIPPED | OUTPATIENT
Start: 2022-06-09

## 2022-06-09 RX ORDER — CYANOCOBALAMIN 1000 UG/ML
INJECTION, SOLUTION INTRAMUSCULAR; SUBCUTANEOUS
COMMUNITY
Start: 2022-04-18

## 2022-06-09 NOTE — PROGRESS NOTES
Subjective:       Patient ID: Rafia Singh is a 67 y.o. female     Chief Complaint:    Chief Complaint   Patient presents with    Neurologic Problem     L Ankle numbness and tingling         Allergies:  Nitrofurantoin monohyd/m-cryst    Current Medications:    Outpatient Encounter Medications as of 6/9/2022   Medication Sig Dispense Refill    amLODIPine (NORVASC) 5 MG tablet Take 5 mg by mouth once daily.      cholecalciferol, vitamin D3, (VITAMIN D3) 25 mcg (1,000 unit) capsule 1 capsule      colestipoL (COLESTID) 1 gram Tab Take 1 g by mouth 2 (two) times daily.      cyanocobalamin 1,000 mcg/mL injection INJECT 1 ML INTO THE SKIN MONTHLY AS DIRECTED      furosemide (LASIX) 20 MG tablet Take 20 mg by mouth once daily.      glimepiride (AMARYL) 4 MG tablet Take 4 mg by mouth once daily.      lisinopriL (PRINIVIL,ZESTRIL) 20 MG tablet Take 20 mg by mouth once daily.      metFORMIN (GLUCOPHAGE-XR) 500 MG ER 24hr tablet Take 500 mg by mouth once daily.      omeprazole (PRILOSEC) 20 MG capsule Take 20 mg by mouth once daily.      ONETOUCH VERIO TEST STRIPS Strp       gabapentin (NEURONTIN) 300 MG capsule One tab po bid or tid 270 capsule 3    sertraline (ZOLOFT) 100 MG tablet Take 1 tablet (100 mg total) by mouth once daily. 30 tablet 1     No facility-administered encounter medications on file as of 6/9/2022.       History of Present Illness  68 Yo WF in clinic for eval poss neuropathy in foot- pt s/p Ortho stress fx cuboid bone in L ankle and has experienced some incr paresthesias described as numbness, tingling, burning sensations in the ankle and foot- o/p PT/REhab in [place] for last 3 months has not helped symptoms   May need NCV to verify peripheral neuropathy and trial Ede 300 mg bid for symptoms alleviation          Past Medical History:   Diagnosis Date    Aortic valve sclerosis     with murmur    Diabetes mellitus, type 2     NIDDM    GERD (gastroesophageal reflux disease)      "High cholesterol     High triglycerides     Hypertension     Murmur     MVP (mitral valve prolapse)        Past Surgical History:   Procedure Laterality Date    CHOLECYSTECTOMY  2010    HYSTERECTOMY      OOPHORECTOMY      SHOULDER ARTHROSCOPY Left 12/2020    SINUS SURGERY      trigger thumb      TUBAL LIGATION         Social History  Ms. Singh  reports that she has never smoked. She has never used smokeless tobacco. She reports previous alcohol use. She reports that she does not use drugs.    Family History  Ms.'s Singh family history includes Heart disease in her brother and mother; Heart failure in her mother; No Known Problems in her father, maternal aunt, maternal grandfather, maternal grandmother, maternal uncle, paternal aunt, paternal grandfather, paternal grandmother, paternal uncle, and sister.    Review of Systems  Review of Systems   Neurological: Positive for tingling.   All other systems reviewed and are negative.     Objective:   /80 (BP Location: Left arm, Patient Position: Sitting, BP Method: Large (Manual))   Pulse 69   Ht 5' 4" (1.626 m)   Wt 85 kg (187 lb 8 oz)   SpO2 97%   BMI 32.18 kg/m²    NEUROLOGICAL EXAMINATION:     MENTAL STATUS   Oriented to person, place, and time.   Level of consciousness: alert  Knowledge: good.     CRANIAL NERVES   Cranial nerves II through XII intact.     MOTOR EXAM   Muscle bulk: normal  Overall muscle tone: normal    Strength   Strength 5/5 throughout.     REFLEXES     Reflexes   Right brachioradialis: 2+  Left brachioradialis: 2+  Right biceps: 2+  Left biceps: 2+  Right triceps: 2+  Left triceps: 2+  Right patellar: 2+  Left patellar: 2+  Right achilles: 2+  Left achilles: 2+  Right plantar: normal  Left plantar: normal    SENSORY EXAM        Paresthesias in distal ankle and foot      GAIT AND COORDINATION     Gait  Gait: normal       Physical Exam  Vitals reviewed.   Neurological:      Mental Status: She is oriented to person, place, and " time.      Gait: Gait is intact.      Deep Tendon Reflexes: Strength normal.      Reflex Scores:       Tricep reflexes are 2+ on the right side and 2+ on the left side.       Bicep reflexes are 2+ on the right side and 2+ on the left side.       Brachioradialis reflexes are 2+ on the right side and 2+ on the left side.       Patellar reflexes are 2+ on the right side and 2+ on the left side.       Achilles reflexes are 2+ on the right side and 2+ on the left side.         Assessment:     Peripheral polyneuropathy  Comments:  L ankle/foot paresthesias   Orders:  -     Ambulatory referral/consult to Neurology  -     Ambulatory referral/consult to Neurology; Future; Expected date: 06/16/2022    Left ankle pain, unspecified chronicity  -     Ambulatory referral/consult to Neurology    Other orders  -     gabapentin (NEURONTIN) 300 MG capsule; One tab po bid or tid  Dispense: 270 capsule; Refill: 3         Primary Diagnosis and ICD10  Peripheral polyneuropathy [G62.9]    Plan:     Patient Instructions   Trial Evelyne 300 mg bid   NCV at [facility] w/ DR Escalona r/o neuropathy      There are no discontinued medications.    Requested Prescriptions     Signed Prescriptions Disp Refills    gabapentin (NEURONTIN) 300 MG capsule 270 capsule 3     Sig: One tab po bid or tid

## 2022-08-11 DIAGNOSIS — G95.9 CERVICAL MYELOPATHY: Primary | ICD-10-CM

## 2022-09-13 ENCOUNTER — OFFICE VISIT (OUTPATIENT)
Dept: NEUROLOGY | Facility: CLINIC | Age: 67
End: 2022-09-13
Payer: MEDICARE

## 2022-09-13 VITALS
HEART RATE: 80 BPM | OXYGEN SATURATION: 96 % | DIASTOLIC BLOOD PRESSURE: 76 MMHG | BODY MASS INDEX: 31.58 KG/M2 | SYSTOLIC BLOOD PRESSURE: 122 MMHG | WEIGHT: 185 LBS | HEIGHT: 64 IN

## 2022-09-13 DIAGNOSIS — E11.43 DIABETIC AUTONOMIC NEUROPATHY ASSOCIATED WITH TYPE 2 DIABETES MELLITUS: Primary | ICD-10-CM

## 2022-09-13 PROCEDURE — 3008F PR BODY MASS INDEX (BMI) DOCUMENTED: ICD-10-PCS | Mod: CPTII,,, | Performed by: PSYCHIATRY & NEUROLOGY

## 2022-09-13 PROCEDURE — 4010F ACE/ARB THERAPY RXD/TAKEN: CPT | Mod: CPTII,,, | Performed by: PSYCHIATRY & NEUROLOGY

## 2022-09-13 PROCEDURE — 3008F BODY MASS INDEX DOCD: CPT | Mod: CPTII,,, | Performed by: PSYCHIATRY & NEUROLOGY

## 2022-09-13 PROCEDURE — 3078F PR MOST RECENT DIASTOLIC BLOOD PRESSURE < 80 MM HG: ICD-10-PCS | Mod: CPTII,,, | Performed by: PSYCHIATRY & NEUROLOGY

## 2022-09-13 PROCEDURE — 99214 OFFICE O/P EST MOD 30 MIN: CPT | Mod: PBBFAC | Performed by: PSYCHIATRY & NEUROLOGY

## 2022-09-13 PROCEDURE — 3074F PR MOST RECENT SYSTOLIC BLOOD PRESSURE < 130 MM HG: ICD-10-PCS | Mod: CPTII,,, | Performed by: PSYCHIATRY & NEUROLOGY

## 2022-09-13 PROCEDURE — 1159F MED LIST DOCD IN RCRD: CPT | Mod: CPTII,,, | Performed by: PSYCHIATRY & NEUROLOGY

## 2022-09-13 PROCEDURE — 3078F DIAST BP <80 MM HG: CPT | Mod: CPTII,,, | Performed by: PSYCHIATRY & NEUROLOGY

## 2022-09-13 PROCEDURE — 1160F RVW MEDS BY RX/DR IN RCRD: CPT | Mod: CPTII,,, | Performed by: PSYCHIATRY & NEUROLOGY

## 2022-09-13 PROCEDURE — 99214 OFFICE O/P EST MOD 30 MIN: CPT | Mod: S$PBB,,, | Performed by: PSYCHIATRY & NEUROLOGY

## 2022-09-13 PROCEDURE — 1159F PR MEDICATION LIST DOCUMENTED IN MEDICAL RECORD: ICD-10-PCS | Mod: CPTII,,, | Performed by: PSYCHIATRY & NEUROLOGY

## 2022-09-13 PROCEDURE — 99214 PR OFFICE/OUTPT VISIT, EST, LEVL IV, 30-39 MIN: ICD-10-PCS | Mod: S$PBB,,, | Performed by: PSYCHIATRY & NEUROLOGY

## 2022-09-13 PROCEDURE — 4010F PR ACE/ARB THEARPY RXD/TAKEN: ICD-10-PCS | Mod: CPTII,,, | Performed by: PSYCHIATRY & NEUROLOGY

## 2022-09-13 PROCEDURE — 3074F SYST BP LT 130 MM HG: CPT | Mod: CPTII,,, | Performed by: PSYCHIATRY & NEUROLOGY

## 2022-09-13 PROCEDURE — 1160F PR REVIEW ALL MEDS BY PRESCRIBER/CLIN PHARMACIST DOCUMENTED: ICD-10-PCS | Mod: CPTII,,, | Performed by: PSYCHIATRY & NEUROLOGY

## 2022-09-13 NOTE — PROGRESS NOTES
Subjective:       Patient ID: Rafia Singh is a 67 y.o. female     Chief Complaint:    Chief Complaint   Patient presents with    Follow-up     3m        Allergies:  Nitrofurantoin monohyd/m-cryst    Current Medications:    Outpatient Encounter Medications as of 9/13/2022   Medication Sig Dispense Refill    amLODIPine (NORVASC) 5 MG tablet Take 5 mg by mouth once daily.      cholecalciferol, vitamin D3, (VITAMIN D3) 25 mcg (1,000 unit) capsule 1 capsule      colestipoL (COLESTID) 1 gram Tab Take 1 g by mouth 2 (two) times daily.      cyanocobalamin 1,000 mcg/mL injection INJECT 1 ML INTO THE SKIN MONTHLY AS DIRECTED      furosemide (LASIX) 20 MG tablet Take 20 mg by mouth once daily.      gabapentin (NEURONTIN) 300 MG capsule One tab po bid or tid 270 capsule 3    glimepiride (AMARYL) 4 MG tablet Take 4 mg by mouth once daily.      lisinopriL (PRINIVIL,ZESTRIL) 20 MG tablet Take 20 mg by mouth once daily.      metFORMIN (GLUCOPHAGE-XR) 500 MG ER 24hr tablet Take 500 mg by mouth once daily.      omeprazole (PRILOSEC) 20 MG capsule Take 20 mg by mouth once daily.      ONETOUCH VERIO TEST STRIPS Strp       sertraline (ZOLOFT) 100 MG tablet Take 1 tablet (100 mg total) by mouth once daily. 30 tablet 1     No facility-administered encounter medications on file as of 9/13/2022.       History of Present Illness  66 yo WF w/ mild DM periph neuropathy per NCV/EMG but no other myopathy or radiculopathy noted  MRI C spine showed on ly mild spondylosis C5-7  Pt may be able to tolerate incr Evelyne       Past Medical History:   Diagnosis Date    Aortic valve sclerosis     with murmur    Diabetes mellitus, type 2     NIDDM    GERD (gastroesophageal reflux disease)     High cholesterol     High triglycerides     Hypertension     Murmur     MVP (mitral valve prolapse)        Past Surgical History:   Procedure Laterality Date    CHOLECYSTECTOMY  2010    HYSTERECTOMY      OOPHORECTOMY      SHOULDER  "ARTHROSCOPY Left 12/2020    SINUS SURGERY      trigger thumb      TUBAL LIGATION         Social History  Ms. Singh  reports that she has never smoked. She has never used smokeless tobacco. She reports that she does not currently use alcohol. She reports that she does not use drugs.    Family History  Ms.'s Singh family history includes Heart disease in her brother and mother; Heart failure in her mother; No Known Problems in her father, maternal aunt, maternal grandfather, maternal grandmother, maternal uncle, paternal aunt, paternal grandfather, paternal grandmother, paternal uncle, and sister.    Review of Systems  Review of Systems   Neurological:  Positive for tingling.   All other systems reviewed and are negative.   Objective:   /76 (BP Location: Left arm, Patient Position: Sitting, BP Method: Medium (Manual))   Pulse 80   Ht 5' 4" (1.626 m)   Wt 83.9 kg (185 lb)   SpO2 96%   BMI 31.76 kg/m²    NEUROLOGICAL EXAMINATION:     MENTAL STATUS   Oriented to person, place, and time.   Level of consciousness: alert  Knowledge: good.     CRANIAL NERVES   Cranial nerves II through XII intact.     MOTOR EXAM   Muscle bulk: normal  Overall muscle tone: normal    Strength   Strength 5/5 throughout.     REFLEXES     Reflexes   Right brachioradialis: 2+  Left brachioradialis: 2+  Right biceps: 2+  Left biceps: 2+  Right triceps: 2+  Left triceps: 2+  Right patellar: 2+  Left patellar: 2+  Right achilles: 2+  Left achilles: 2+  Right plantar: normal  Left plantar: normal    SENSORY EXAM        Pareth esias in both hand /feet      GAIT AND COORDINATION     Gait  Gait: normal     Physical Exam  Vitals reviewed.   Neurological:      Mental Status: She is alert and oriented to person, place, and time. Mental status is at baseline.      Cranial Nerves: Cranial nerves 2-12 are intact.      Motor: Motor strength is normal.      Gait: Gait is intact.      Deep Tendon Reflexes:      Reflex Scores:       Tricep " reflexes are 2+ on the right side and 2+ on the left side.       Bicep reflexes are 2+ on the right side and 2+ on the left side.       Brachioradialis reflexes are 2+ on the right side and 2+ on the left side.       Patellar reflexes are 2+ on the right side and 2+ on the left side.       Achilles reflexes are 2+ on the right side and 2+ on the left side.       Assessment:     There are no diagnoses linked to this encounter.     Primary Diagnosis and ICD10  No primary diagnosis found.    Plan:     There are no Patient Instructions on file for this visit.    There are no discontinued medications.    Requested Prescriptions      No prescriptions requested or ordered in this encounter

## 2023-03-20 ENCOUNTER — OFFICE VISIT (OUTPATIENT)
Dept: NEUROLOGY | Facility: CLINIC | Age: 68
End: 2023-03-20
Payer: MEDICARE

## 2023-03-20 VITALS
WEIGHT: 185 LBS | DIASTOLIC BLOOD PRESSURE: 72 MMHG | SYSTOLIC BLOOD PRESSURE: 142 MMHG | OXYGEN SATURATION: 98 % | HEIGHT: 64 IN | HEART RATE: 89 BPM | BODY MASS INDEX: 31.58 KG/M2

## 2023-03-20 DIAGNOSIS — G62.9 PERIPHERAL POLYNEUROPATHY: Primary | ICD-10-CM

## 2023-03-20 PROCEDURE — 4010F ACE/ARB THERAPY RXD/TAKEN: CPT | Mod: CPTII,,, | Performed by: PSYCHIATRY & NEUROLOGY

## 2023-03-20 PROCEDURE — 3077F SYST BP >= 140 MM HG: CPT | Mod: CPTII,,, | Performed by: PSYCHIATRY & NEUROLOGY

## 2023-03-20 PROCEDURE — 1101F PT FALLS ASSESS-DOCD LE1/YR: CPT | Mod: CPTII,,, | Performed by: PSYCHIATRY & NEUROLOGY

## 2023-03-20 PROCEDURE — 3078F PR MOST RECENT DIASTOLIC BLOOD PRESSURE < 80 MM HG: ICD-10-PCS | Mod: CPTII,,, | Performed by: PSYCHIATRY & NEUROLOGY

## 2023-03-20 PROCEDURE — 3288F FALL RISK ASSESSMENT DOCD: CPT | Mod: CPTII,,, | Performed by: PSYCHIATRY & NEUROLOGY

## 2023-03-20 PROCEDURE — 3008F BODY MASS INDEX DOCD: CPT | Mod: CPTII,,, | Performed by: PSYCHIATRY & NEUROLOGY

## 2023-03-20 PROCEDURE — 1101F PR PT FALLS ASSESS DOC 0-1 FALLS W/OUT INJ PAST YR: ICD-10-PCS | Mod: CPTII,,, | Performed by: PSYCHIATRY & NEUROLOGY

## 2023-03-20 PROCEDURE — 3078F DIAST BP <80 MM HG: CPT | Mod: CPTII,,, | Performed by: PSYCHIATRY & NEUROLOGY

## 2023-03-20 PROCEDURE — 1159F MED LIST DOCD IN RCRD: CPT | Mod: CPTII,,, | Performed by: PSYCHIATRY & NEUROLOGY

## 2023-03-20 PROCEDURE — 3008F PR BODY MASS INDEX (BMI) DOCUMENTED: ICD-10-PCS | Mod: CPTII,,, | Performed by: PSYCHIATRY & NEUROLOGY

## 2023-03-20 PROCEDURE — 3077F PR MOST RECENT SYSTOLIC BLOOD PRESSURE >= 140 MM HG: ICD-10-PCS | Mod: CPTII,,, | Performed by: PSYCHIATRY & NEUROLOGY

## 2023-03-20 PROCEDURE — 99214 OFFICE O/P EST MOD 30 MIN: CPT | Mod: S$PBB,,, | Performed by: PSYCHIATRY & NEUROLOGY

## 2023-03-20 PROCEDURE — 4010F PR ACE/ARB THEARPY RXD/TAKEN: ICD-10-PCS | Mod: CPTII,,, | Performed by: PSYCHIATRY & NEUROLOGY

## 2023-03-20 PROCEDURE — 1160F PR REVIEW ALL MEDS BY PRESCRIBER/CLIN PHARMACIST DOCUMENTED: ICD-10-PCS | Mod: CPTII,,, | Performed by: PSYCHIATRY & NEUROLOGY

## 2023-03-20 PROCEDURE — 1159F PR MEDICATION LIST DOCUMENTED IN MEDICAL RECORD: ICD-10-PCS | Mod: CPTII,,, | Performed by: PSYCHIATRY & NEUROLOGY

## 2023-03-20 PROCEDURE — 99214 OFFICE O/P EST MOD 30 MIN: CPT | Mod: PBBFAC | Performed by: PSYCHIATRY & NEUROLOGY

## 2023-03-20 PROCEDURE — 1160F RVW MEDS BY RX/DR IN RCRD: CPT | Mod: CPTII,,, | Performed by: PSYCHIATRY & NEUROLOGY

## 2023-03-20 PROCEDURE — 3288F PR FALLS RISK ASSESSMENT DOCUMENTED: ICD-10-PCS | Mod: CPTII,,, | Performed by: PSYCHIATRY & NEUROLOGY

## 2023-03-20 PROCEDURE — 99214 PR OFFICE/OUTPT VISIT, EST, LEVL IV, 30-39 MIN: ICD-10-PCS | Mod: S$PBB,,, | Performed by: PSYCHIATRY & NEUROLOGY

## 2023-03-20 NOTE — PROGRESS NOTES
Subjective:       Patient ID: Rafia Singh is a 68 y.o. female     Chief Complaint:    Chief Complaint   Patient presents with    Follow-up        Allergies:  Nitrofurantoin monohyd/m-cryst    Current Medications:    Outpatient Encounter Medications as of 3/20/2023   Medication Sig Dispense Refill    amLODIPine (NORVASC) 5 MG tablet Take 5 mg by mouth once daily.      cholecalciferol, vitamin D3, (VITAMIN D3) 25 mcg (1,000 unit) capsule 1 capsule      colestipoL (COLESTID) 1 gram Tab Take 1 g by mouth 2 (two) times daily.      cyanocobalamin 1,000 mcg/mL injection INJECT 1 ML INTO THE SKIN MONTHLY AS DIRECTED      furosemide (LASIX) 20 MG tablet Take 20 mg by mouth once daily.      gabapentin (NEURONTIN) 300 MG capsule One tab po bid or tid 270 capsule 3    glimepiride (AMARYL) 4 MG tablet Take 4 mg by mouth once daily.      lisinopriL (PRINIVIL,ZESTRIL) 20 MG tablet Take 20 mg by mouth once daily.      metFORMIN (GLUCOPHAGE-XR) 500 MG ER 24hr tablet Take 500 mg by mouth once daily.      omeprazole (PRILOSEC) 20 MG capsule Take 20 mg by mouth once daily.      ONETOUCH VERIO TEST STRIPS Strp       sertraline (ZOLOFT) 100 MG tablet Take 1 tablet (100 mg total) by mouth once daily. 30 tablet 1     No facility-administered encounter medications on file as of 3/20/2023.       History of Present Illness  67 yo WF w/ mild DM periph neuropathy-now on Evelyne 300 mg bid but cannot tolerate tid due to sedation and fatigue   Pt concerned of costs of switching to Lyrica                Past Medical History:   Diagnosis Date    Aortic valve sclerosis     with murmur    Diabetes mellitus, type 2     NIDDM    GERD (gastroesophageal reflux disease)     High cholesterol     High triglycerides     Hypertension     Murmur     MVP (mitral valve prolapse)        Past Surgical History:   Procedure Laterality Date    CHOLECYSTECTOMY  2010    HYSTERECTOMY      OOPHORECTOMY      SHOULDER ARTHROSCOPY Left 12/2020    SINUS SURGERY       "trigger thumb      TUBAL LIGATION         Social History  Ms. Singh  reports that she has never smoked. She has never used smokeless tobacco. She reports that she does not currently use alcohol. She reports that she does not use drugs.    Family History  Ms.'s Singh family history includes Heart disease in her brother and mother; Heart failure in her mother; No Known Problems in her father, maternal aunt, maternal grandfather, maternal grandmother, maternal uncle, paternal aunt, paternal grandfather, paternal grandmother, paternal uncle, and sister.    Review of Systems  Review of Systems   Neurological:  Positive for tingling and sensory change.   All other systems reviewed and are negative.   Objective:   BP (!) 142/72 (BP Location: Left arm, Patient Position: Sitting, BP Method: Large (Manual))   Pulse 89   Ht 5' 4" (1.626 m)   Wt 83.9 kg (185 lb)   SpO2 98%   BMI 31.76 kg/m²    NEUROLOGICAL EXAMINATION:     MENTAL STATUS   Oriented to person, place, and time.   Level of consciousness: alert  Knowledge: good.     CRANIAL NERVES   Cranial nerves II through XII intact.     MOTOR EXAM     Strength   Strength 5/5 throughout.     SENSORY EXAM        Paresthesias in distal ext's     GAIT AND COORDINATION     Gait  Gait: normal     Physical Exam  Vitals reviewed.   Constitutional:       Appearance: She is normal weight.   Neurological:      General: No focal deficit present.      Mental Status: She is alert and oriented to person, place, and time. Mental status is at baseline.      Cranial Nerves: Cranial nerves 2-12 are intact.      Motor: Motor strength is normal.      Gait: Gait is intact.        Assessment:     Peripheral polyneuropathy         Primary Diagnosis and ICD10  Peripheral polyneuropathy [G62.9]    Plan:     There are no Patient Instructions on file for this visit.    There are no discontinued medications.    Requested Prescriptions      No prescriptions requested or ordered in this encounter "

## 2023-04-04 ENCOUNTER — OFFICE VISIT (OUTPATIENT)
Dept: CARDIOLOGY | Facility: CLINIC | Age: 68
End: 2023-04-04
Payer: MEDICARE

## 2023-04-04 VITALS
RESPIRATION RATE: 18 BRPM | SYSTOLIC BLOOD PRESSURE: 108 MMHG | WEIGHT: 183.81 LBS | BODY MASS INDEX: 31.38 KG/M2 | HEART RATE: 72 BPM | HEIGHT: 64 IN | DIASTOLIC BLOOD PRESSURE: 70 MMHG

## 2023-04-04 DIAGNOSIS — I10 HYPERTENSION, ESSENTIAL: Chronic | ICD-10-CM

## 2023-04-04 DIAGNOSIS — I35.8 AORTIC VALVE SCLEROSIS: Primary | Chronic | ICD-10-CM

## 2023-04-04 DIAGNOSIS — E78.5 HYPERLIPIDEMIA, UNSPECIFIED HYPERLIPIDEMIA TYPE: Chronic | ICD-10-CM

## 2023-04-04 DIAGNOSIS — M79.7 FIBROMYALGIA: Chronic | ICD-10-CM

## 2023-04-04 DIAGNOSIS — E11.9 DIABETES MELLITUS WITHOUT COMPLICATION: Chronic | ICD-10-CM

## 2023-04-04 DIAGNOSIS — I34.1 MVP (MITRAL VALVE PROLAPSE): Chronic | ICD-10-CM

## 2023-04-04 DIAGNOSIS — G47.33 OSA (OBSTRUCTIVE SLEEP APNEA): Chronic | ICD-10-CM

## 2023-04-04 PROCEDURE — 1160F RVW MEDS BY RX/DR IN RCRD: CPT | Mod: CPTII,,, | Performed by: INTERNAL MEDICINE

## 2023-04-04 PROCEDURE — 3078F DIAST BP <80 MM HG: CPT | Mod: CPTII,,, | Performed by: INTERNAL MEDICINE

## 2023-04-04 PROCEDURE — 3074F PR MOST RECENT SYSTOLIC BLOOD PRESSURE < 130 MM HG: ICD-10-PCS | Mod: CPTII,,, | Performed by: INTERNAL MEDICINE

## 2023-04-04 PROCEDURE — 3008F PR BODY MASS INDEX (BMI) DOCUMENTED: ICD-10-PCS | Mod: CPTII,,, | Performed by: INTERNAL MEDICINE

## 2023-04-04 PROCEDURE — 1159F PR MEDICATION LIST DOCUMENTED IN MEDICAL RECORD: ICD-10-PCS | Mod: CPTII,,, | Performed by: INTERNAL MEDICINE

## 2023-04-04 PROCEDURE — 4010F ACE/ARB THERAPY RXD/TAKEN: CPT | Mod: CPTII,,, | Performed by: INTERNAL MEDICINE

## 2023-04-04 PROCEDURE — 99214 OFFICE O/P EST MOD 30 MIN: CPT | Mod: PBBFAC | Performed by: INTERNAL MEDICINE

## 2023-04-04 PROCEDURE — 4010F PR ACE/ARB THEARPY RXD/TAKEN: ICD-10-PCS | Mod: CPTII,,, | Performed by: INTERNAL MEDICINE

## 2023-04-04 PROCEDURE — 1159F MED LIST DOCD IN RCRD: CPT | Mod: CPTII,,, | Performed by: INTERNAL MEDICINE

## 2023-04-04 PROCEDURE — 93010 EKG 12-LEAD: ICD-10-PCS | Mod: S$PBB,,, | Performed by: INTERNAL MEDICINE

## 2023-04-04 PROCEDURE — 99215 OFFICE O/P EST HI 40 MIN: CPT | Mod: S$PBB,,, | Performed by: INTERNAL MEDICINE

## 2023-04-04 PROCEDURE — 99215 PR OFFICE/OUTPT VISIT, EST, LEVL V, 40-54 MIN: ICD-10-PCS | Mod: S$PBB,,, | Performed by: INTERNAL MEDICINE

## 2023-04-04 PROCEDURE — 1160F PR REVIEW ALL MEDS BY PRESCRIBER/CLIN PHARMACIST DOCUMENTED: ICD-10-PCS | Mod: CPTII,,, | Performed by: INTERNAL MEDICINE

## 2023-04-04 PROCEDURE — 93005 ELECTROCARDIOGRAM TRACING: CPT | Mod: PBBFAC | Performed by: INTERNAL MEDICINE

## 2023-04-04 PROCEDURE — 93010 ELECTROCARDIOGRAM REPORT: CPT | Mod: S$PBB,,, | Performed by: INTERNAL MEDICINE

## 2023-04-04 PROCEDURE — 3074F SYST BP LT 130 MM HG: CPT | Mod: CPTII,,, | Performed by: INTERNAL MEDICINE

## 2023-04-04 PROCEDURE — 3078F PR MOST RECENT DIASTOLIC BLOOD PRESSURE < 80 MM HG: ICD-10-PCS | Mod: CPTII,,, | Performed by: INTERNAL MEDICINE

## 2023-04-04 PROCEDURE — 3008F BODY MASS INDEX DOCD: CPT | Mod: CPTII,,, | Performed by: INTERNAL MEDICINE

## 2023-04-11 ENCOUNTER — HOSPITAL ENCOUNTER (OUTPATIENT)
Dept: RADIOLOGY | Facility: HOSPITAL | Age: 68
Discharge: HOME | End: 2023-04-11
Attending: RADIOLOGY
Payer: MEDICARE

## 2023-04-11 DIAGNOSIS — Z12.31 VISIT FOR SCREENING MAMMOGRAM: ICD-10-CM

## 2023-04-11 PROCEDURE — 77067 SCR MAMMO BI INCL CAD: CPT | Mod: 26,,, | Performed by: RADIOLOGY

## 2023-04-11 PROCEDURE — 77067 MAMMO DIGITAL SCREENING BILAT: ICD-10-PCS | Mod: 26,,, | Performed by: RADIOLOGY

## 2023-04-11 PROCEDURE — 77067 SCR MAMMO BI INCL CAD: CPT | Mod: TC

## 2023-04-19 NOTE — PROGRESS NOTES
PCP: Chelsea Steele II, MD    Referring Provider:     Subjective:   Rafia Singh is a 68 y.o. female with hx of aortic valve sclerosis with murmur, HTN, HLD, MVP, and NIDDM who presents for one year follow up.          Fhx:  Family History   Problem Relation Age of Onset    Heart failure Mother     Heart disease Mother     No Known Problems Father     No Known Problems Sister     Heart disease Brother     No Known Problems Maternal Aunt     No Known Problems Maternal Uncle     No Known Problems Paternal Aunt     No Known Problems Paternal Uncle     No Known Problems Maternal Grandmother     No Known Problems Maternal Grandfather     No Known Problems Paternal Grandmother     No Known Problems Paternal Grandfather     Stroke Neg Hx     Diabetes Neg Hx      Shx:   Social History     Socioeconomic History    Marital status:    Tobacco Use    Smoking status: Never    Smokeless tobacco: Never   Substance and Sexual Activity    Alcohol use: Not Currently    Drug use: Never       EKG   4/4/23--NSR, 70 bpm  4/4/22--NSR, 76 bpm    Echo    2/7/22--Interpretation Summary  · The left ventricle is normal in size with normal systolic function.  · The estimated ejection fraction is 60%.  · Left ventricular diastolic dysfunction.  · Mild right ventricular enlargement.  · Mild right atrial enlargement.  · Mild tricuspid regurgitation.  · Normal central venous pressure (3 mmHg).  · The estimated PA systolic pressure is 24 mmHg.    [facility]:    7/22/14--No CAD.  EF 60%. No MR.       Lab Results   Component Value Date    K 3.3 (L) 12/15/2020       Lab Results   Component Value Date    HGB 11.9 (L) 12/15/2020    HCT 33.8 (L) 12/15/2020           Current Outpatient Medications:     amLODIPine (NORVASC) 5 MG tablet, Take 5 mg by mouth once daily., Disp: , Rfl:     colestipoL (COLESTID) 1 gram Tab, Take 1 g by mouth 2 (two) times daily., Disp: , Rfl:     furosemide (LASIX) 20 MG tablet, Take 40 mg by mouth once daily., Disp: , Rfl:     " gabapentin (NEURONTIN) 300 MG capsule, One tab po bid or tid, Disp: 270 capsule, Rfl: 3    glimepiride (AMARYL) 4 MG tablet, Take 4 mg by mouth once daily., Disp: , Rfl:     lisinopriL (PRINIVIL,ZESTRIL) 20 MG tablet, Take 20 mg by mouth once daily., Disp: , Rfl:     metFORMIN (GLUCOPHAGE-XR) 500 MG ER 24hr tablet, Take 500 mg by mouth once daily., Disp: , Rfl:     omeprazole (PRILOSEC) 20 MG capsule, Take 20 mg by mouth once daily., Disp: , Rfl:     cholecalciferol, vitamin D3, (VITAMIN D3) 25 mcg (1,000 unit) capsule, 1 capsule, Disp: , Rfl:     cyanocobalamin 1,000 mcg/mL injection, INJECT 1 ML INTO THE SKIN MONTHLY AS DIRECTED, Disp: , Rfl:     ONETOUCH VERIO TEST STRIPS Strp, , Disp: , Rfl:     sertraline (ZOLOFT) 100 MG tablet, Take 1 tablet (100 mg total) by mouth once daily., Disp: 30 tablet, Rfl: 1  Medications reviewed and reconciled.     Review of Systems   Constitutional:  Positive for malaise/fatigue.   Respiratory:  Positive for shortness of breath.    Cardiovascular:  Positive for chest pain, palpitations and leg swelling.   Neurological:  Positive for dizziness. Negative for loss of consciousness.         Objective:   /70 (BP Location: Left arm, Patient Position: Sitting)   Pulse 72   Resp 18   Ht 5' 4" (1.626 m)   Wt 83.4 kg (183 lb 12.8 oz)   BMI 31.55 kg/m²     Physical Exam  Vitals reviewed.   Constitutional:       Appearance: Normal appearance.   HENT:      Head: Normocephalic and atraumatic.   Neck:      Vascular: No carotid bruit or JVD.   Cardiovascular:      Rate and Rhythm: Normal rate and regular rhythm.      Pulses: Normal pulses.           Radial pulses are 2+ on the right side.      Heart sounds: Normal heart sounds. No murmur heard.  Pulmonary:      Effort: Pulmonary effort is normal.      Breath sounds: Normal breath sounds.   Musculoskeletal:      Right lower leg: No edema.      Left lower leg: No edema.   Skin:     General: Skin is warm and dry.   Neurological:      " Mental Status: She is alert and oriented to person, place, and time.         Assessment:     1. Aortic valve sclerosis  EKG 12-lead    EKG 12-lead    Echo      2. Hyperlipidemia, unspecified hyperlipidemia type        3. Hypertension, essential        4. Diabetes mellitus without complication        5. MVP (mitral valve prolapse)        6. JUDI (obstructive sleep apnea)      no CPAP      7. Fibromyalgia              Plan:   Call with echo results  F/u in one year.

## 2023-04-28 PROBLEM — M79.7 FIBROMYALGIA: Chronic | Status: ACTIVE | Noted: 2023-04-28

## 2023-04-28 PROBLEM — G47.33 OSA (OBSTRUCTIVE SLEEP APNEA): Chronic | Status: ACTIVE | Noted: 2023-04-28

## 2023-05-01 ENCOUNTER — HOSPITAL ENCOUNTER (OUTPATIENT)
Dept: CARDIOLOGY | Facility: HOSPITAL | Age: 68
Discharge: HOME | End: 2023-05-01
Attending: INTERNAL MEDICINE
Payer: MEDICARE

## 2023-05-01 VITALS — HEIGHT: 64 IN | WEIGHT: 183 LBS | BODY MASS INDEX: 31.24 KG/M2

## 2023-05-01 DIAGNOSIS — I35.8 AORTIC VALVE SCLEROSIS: ICD-10-CM

## 2023-05-01 PROCEDURE — 93306 ECHO (CUPID ONLY): ICD-10-PCS | Mod: 26,,, | Performed by: INTERNAL MEDICINE

## 2023-05-01 PROCEDURE — 93306 TTE W/DOPPLER COMPLETE: CPT | Mod: 26,,, | Performed by: INTERNAL MEDICINE

## 2023-05-01 PROCEDURE — 93306 TTE W/DOPPLER COMPLETE: CPT

## 2023-05-08 LAB
AORTIC VALVE CUSP SEPERATION: 1.84 CM
AV INDEX (PROSTH): 0.44
AV MEAN GRADIENT: 14 MMHG
AV PEAK GRADIENT: 25 MMHG
AV REGURGITATION PRESSURE HALF TIME: 453 MS
AV VALVE AREA: 1.4 CM2
BSA FOR ECHO PROCEDURE: 1.94 M2
CV ECHO LV RWT: 0.59 CM
DOP CALC AO PEAK VEL: 2.5 M/S
DOP CALC AO VTI: 52.15 CM
DOP CALC LVOT AREA: 3.2 CM2
DOP CALC LVOT DIAMETER: 2.01 CM
DOP CALC LVOT STROKE VOLUME: 73.1 CM3
DOP CALCLVOT PEAK VEL VTI: 23.05 CM
E WAVE DECELERATION TIME: 225 MSEC
E/A RATIO: 0.72
E/E' RATIO: 8.82 M/S
ECHO LV POSTERIOR WALL: 1.27 CM (ref 0.6–1.1)
EJECTION FRACTION: 60 %
FRACTIONAL SHORTENING: 39 % (ref 28–44)
INTERVENTRICULAR SEPTUM: 1.19 CM (ref 0.6–1.1)
IVC DIAMETER: 1.82 CM
LEFT ATRIUM SIZE: 3.54 CM
LEFT INTERNAL DIMENSION IN SYSTOLE: 2.6 CM (ref 2.1–4)
LEFT VENTRICLE DIASTOLIC VOLUME INDEX: 43.46 ML/M2
LEFT VENTRICLE DIASTOLIC VOLUME: 81.71 ML
LEFT VENTRICLE MASS INDEX: 101 G/M2
LEFT VENTRICLE SYSTOLIC VOLUME INDEX: 13.1 ML/M2
LEFT VENTRICLE SYSTOLIC VOLUME: 24.61 ML
LEFT VENTRICULAR INTERNAL DIMENSION IN DIASTOLE: 4.27 CM (ref 3.5–6)
LEFT VENTRICULAR MASS: 189.44 G
LV LATERAL E/E' RATIO: 9.38 M/S
LV SEPTAL E/E' RATIO: 8.33 M/S
MV PEAK A VEL: 1.04 M/S
MV PEAK E VEL: 0.75 M/S
OHS LV EJECTION FRACTION SIMPSONS BIPLANE MOD: 6 %
PISA AR MAX VEL: 3.61 M/S
PISA TR MAX VEL: 2.64 M/S
RA WIDTH: 3.44 CM
RIGHT ATRIAL AREA: 16.4 CM2
RIGHT VENTRICULAR END-DIASTOLIC DIMENSION: 2.55 CM
RIGHT VENTRICULAR LENGTH IN DIASTOLE (APICAL 4-CHAMBER VIEW): 6.45 CM
RV MID DIAMA: 1.57 CM
TDI LATERAL: 0.08 M/S
TDI SEPTAL: 0.09 M/S
TDI: 0.09 M/S
TR MAX PG: 28 MMHG
TRICUSPID ANNULAR PLANE SYSTOLIC EXCURSION: 2.8 CM

## 2023-09-18 ENCOUNTER — OFFICE VISIT (OUTPATIENT)
Dept: NEUROLOGY | Facility: CLINIC | Age: 68
End: 2023-09-18
Payer: MEDICARE

## 2023-09-18 VITALS
OXYGEN SATURATION: 95 % | RESPIRATION RATE: 18 BRPM | HEART RATE: 94 BPM | HEIGHT: 64 IN | DIASTOLIC BLOOD PRESSURE: 66 MMHG | BODY MASS INDEX: 31.24 KG/M2 | SYSTOLIC BLOOD PRESSURE: 112 MMHG | TEMPERATURE: 97 F | WEIGHT: 183 LBS

## 2023-09-18 DIAGNOSIS — G62.9 PERIPHERAL POLYNEUROPATHY: Primary | ICD-10-CM

## 2023-09-18 PROCEDURE — 3078F PR MOST RECENT DIASTOLIC BLOOD PRESSURE < 80 MM HG: ICD-10-PCS | Mod: CPTII,,, | Performed by: PSYCHIATRY & NEUROLOGY

## 2023-09-18 PROCEDURE — 1159F MED LIST DOCD IN RCRD: CPT | Mod: CPTII,,, | Performed by: PSYCHIATRY & NEUROLOGY

## 2023-09-18 PROCEDURE — 99215 OFFICE O/P EST HI 40 MIN: CPT | Mod: PBBFAC | Performed by: PSYCHIATRY & NEUROLOGY

## 2023-09-18 PROCEDURE — 1126F PR PAIN SEVERITY QUANTIFIED, NO PAIN PRESENT: ICD-10-PCS | Mod: CPTII,,, | Performed by: PSYCHIATRY & NEUROLOGY

## 2023-09-18 PROCEDURE — 3008F BODY MASS INDEX DOCD: CPT | Mod: CPTII,,, | Performed by: PSYCHIATRY & NEUROLOGY

## 2023-09-18 PROCEDURE — 1160F PR REVIEW ALL MEDS BY PRESCRIBER/CLIN PHARMACIST DOCUMENTED: ICD-10-PCS | Mod: CPTII,,, | Performed by: PSYCHIATRY & NEUROLOGY

## 2023-09-18 PROCEDURE — 99214 OFFICE O/P EST MOD 30 MIN: CPT | Mod: S$PBB,,, | Performed by: PSYCHIATRY & NEUROLOGY

## 2023-09-18 PROCEDURE — 4010F PR ACE/ARB THEARPY RXD/TAKEN: ICD-10-PCS | Mod: CPTII,,, | Performed by: PSYCHIATRY & NEUROLOGY

## 2023-09-18 PROCEDURE — 3288F PR FALLS RISK ASSESSMENT DOCUMENTED: ICD-10-PCS | Mod: CPTII,,, | Performed by: PSYCHIATRY & NEUROLOGY

## 2023-09-18 PROCEDURE — 3074F PR MOST RECENT SYSTOLIC BLOOD PRESSURE < 130 MM HG: ICD-10-PCS | Mod: CPTII,,, | Performed by: PSYCHIATRY & NEUROLOGY

## 2023-09-18 PROCEDURE — 3288F FALL RISK ASSESSMENT DOCD: CPT | Mod: CPTII,,, | Performed by: PSYCHIATRY & NEUROLOGY

## 2023-09-18 PROCEDURE — 1101F PT FALLS ASSESS-DOCD LE1/YR: CPT | Mod: CPTII,,, | Performed by: PSYCHIATRY & NEUROLOGY

## 2023-09-18 PROCEDURE — 3008F PR BODY MASS INDEX (BMI) DOCUMENTED: ICD-10-PCS | Mod: CPTII,,, | Performed by: PSYCHIATRY & NEUROLOGY

## 2023-09-18 PROCEDURE — 3074F SYST BP LT 130 MM HG: CPT | Mod: CPTII,,, | Performed by: PSYCHIATRY & NEUROLOGY

## 2023-09-18 PROCEDURE — 1159F PR MEDICATION LIST DOCUMENTED IN MEDICAL RECORD: ICD-10-PCS | Mod: CPTII,,, | Performed by: PSYCHIATRY & NEUROLOGY

## 2023-09-18 PROCEDURE — 4010F ACE/ARB THERAPY RXD/TAKEN: CPT | Mod: CPTII,,, | Performed by: PSYCHIATRY & NEUROLOGY

## 2023-09-18 PROCEDURE — 1160F RVW MEDS BY RX/DR IN RCRD: CPT | Mod: CPTII,,, | Performed by: PSYCHIATRY & NEUROLOGY

## 2023-09-18 PROCEDURE — 99214 PR OFFICE/OUTPT VISIT, EST, LEVL IV, 30-39 MIN: ICD-10-PCS | Mod: S$PBB,,, | Performed by: PSYCHIATRY & NEUROLOGY

## 2023-09-18 PROCEDURE — 3078F DIAST BP <80 MM HG: CPT | Mod: CPTII,,, | Performed by: PSYCHIATRY & NEUROLOGY

## 2023-09-18 PROCEDURE — 1101F PR PT FALLS ASSESS DOC 0-1 FALLS W/OUT INJ PAST YR: ICD-10-PCS | Mod: CPTII,,, | Performed by: PSYCHIATRY & NEUROLOGY

## 2023-09-18 PROCEDURE — 1126F AMNT PAIN NOTED NONE PRSNT: CPT | Mod: CPTII,,, | Performed by: PSYCHIATRY & NEUROLOGY

## 2023-09-18 RX ORDER — ERGOCALCIFEROL 1.25 MG/1
CAPSULE ORAL
COMMUNITY
Start: 2023-08-31

## 2023-09-18 NOTE — PROGRESS NOTES
Subjective:       Patient ID: Rafia Singh is a 68 y.o. female     Chief Complaint:    Chief Complaint   Patient presents with    Follow-up     Periph polyneuropathy        Allergies:  Nitrofurantoin monohyd/m-cryst    Current Medications:    Outpatient Encounter Medications as of 9/18/2023   Medication Sig Dispense Refill    amLODIPine (NORVASC) 5 MG tablet Take 5 mg by mouth once daily.      cholecalciferol, vitamin D3, (VITAMIN D3) 25 mcg (1,000 unit) capsule 1 capsule      colestipoL (COLESTID) 1 gram Tab Take 1 g by mouth 2 (two) times daily.      cyanocobalamin 1,000 mcg/mL injection INJECT 1 ML INTO THE SKIN MONTHLY AS DIRECTED      ergocalciferol (ERGOCALCIFEROL) 50,000 unit Cap       furosemide (LASIX) 20 MG tablet Take 40 mg by mouth once daily.      gabapentin (NEURONTIN) 300 MG capsule One tab po bid or tid 270 capsule 3    glimepiride (AMARYL) 4 MG tablet Take 4 mg by mouth once daily.      lisinopriL (PRINIVIL,ZESTRIL) 20 MG tablet Take 20 mg by mouth once daily.      metFORMIN (GLUCOPHAGE-XR) 500 MG ER 24hr tablet Take 500 mg by mouth once daily.      omeprazole (PRILOSEC) 20 MG capsule Take 20 mg by mouth once daily.      ONETOUCH VERIO TEST STRIPS Strp       sertraline (ZOLOFT) 100 MG tablet Take 1 tablet (100 mg total) by mouth once daily. 30 tablet 1     No facility-administered encounter medications on file as of 9/18/2023.       History of Present Illness  67 yo WF w/ signif periph neuropathy on Ede 2-3 x daily w/ good results   She is very fatigued from caring for her  w/ dementia  She has been found anemic, hypotensive and low Vit D         Past Medical History:   Diagnosis Date    Aortic valve sclerosis     with murmur    Diabetes mellitus, type 2     NIDDM    GERD (gastroesophageal reflux disease)     High cholesterol     High triglycerides     Hypertension     Murmur     MVP (mitral valve prolapse)     JUDI on CPAP        Past Surgical History:   Procedure Laterality Date  "   CHOLECYSTECTOMY  2010    HYSTERECTOMY      OOPHORECTOMY      SHOULDER ARTHROSCOPY Left 12/2020    SINUS SURGERY      trigger thumb      TUBAL LIGATION         Social History  Ms. Singh  reports that she has never smoked. She has never used smokeless tobacco. She reports that she does not drink alcohol and does not use drugs.    Family History  Ms.'s Singh family history includes Heart disease in her brother and mother; Heart failure in her mother; No Known Problems in her father, maternal aunt, maternal grandfather, maternal grandmother, maternal uncle, paternal aunt, paternal grandfather, paternal grandmother, paternal uncle, and sister.    Review of Systems  Review of Systems   Neurological:  Positive for tingling and sensory change.   Psychiatric/Behavioral:  Positive for depression.    All other systems reviewed and are negative.     Objective:   /66 (BP Location: Left arm, Patient Position: Sitting, BP Method: Large (Manual))   Pulse 94   Temp 97.4 °F (36.3 °C)   Resp 18   Ht 5' 4" (1.626 m)   Wt 83 kg (183 lb)   SpO2 95%   BMI 31.41 kg/m²    NEUROLOGICAL EXAMINATION:     MENTAL STATUS   Oriented to person, place, and time.   Level of consciousness: alert  Knowledge: consistent with education.     CRANIAL NERVES   Cranial nerves II through XII intact.     MOTOR EXAM     Strength   Strength 5/5 throughout.     SENSORY EXAM        Paresthesias in both feet     GAIT AND COORDINATION     Gait  Gait: normal       Physical Exam  Vitals reviewed.   Constitutional:       Appearance: She is normal weight.   Neurological:      General: No focal deficit present.      Mental Status: She is alert and oriented to person, place, and time. Mental status is at baseline.      Cranial Nerves: Cranial nerves 2-12 are intact.      Motor: Motor strength is normal.     Gait: Gait is intact.          Assessment:     Peripheral polyneuropathy         Primary Diagnosis and ICD10  Peripheral polyneuropathy " [G62.9]    Plan:     Patient Instructions   Needs to consider placement for her  w/ dementia   Needs better sleep pattern   Cont Gabapentin at dose and freq tolerated  F/u 6 months        There are no discontinued medications.    Requested Prescriptions      No prescriptions requested or ordered in this encounter

## 2023-09-18 NOTE — PATIENT INSTRUCTIONS
Needs to consider placement for her  w/ dementia   Needs better sleep pattern   Cont Gabapentin at dose and freq tolerated  F/u 6 months

## 2023-10-27 ENCOUNTER — HOSPITAL ENCOUNTER (OUTPATIENT)
Dept: RADIOLOGY | Facility: HOSPITAL | Age: 68
Discharge: HOME | End: 2023-10-27
Attending: INTERNAL MEDICINE
Payer: MEDICARE

## 2023-10-27 DIAGNOSIS — K76.0 NAFLD (NONALCOHOLIC FATTY LIVER DISEASE): ICD-10-CM

## 2023-10-27 PROCEDURE — 76700 US EXAM ABDOM COMPLETE: CPT | Mod: TC

## 2023-10-27 PROCEDURE — 76700 US ABDOMEN COMPLETE: ICD-10-PCS | Mod: 26,,, | Performed by: RADIOLOGY

## 2023-10-27 PROCEDURE — 76700 US EXAM ABDOM COMPLETE: CPT | Mod: 26,,, | Performed by: RADIOLOGY

## 2024-03-14 ENCOUNTER — OFFICE VISIT (OUTPATIENT)
Dept: NEUROLOGY | Facility: CLINIC | Age: 69
End: 2024-03-14
Payer: MEDICARE

## 2024-03-14 VITALS
BODY MASS INDEX: 31.07 KG/M2 | HEART RATE: 72 BPM | HEIGHT: 64 IN | SYSTOLIC BLOOD PRESSURE: 100 MMHG | DIASTOLIC BLOOD PRESSURE: 58 MMHG | WEIGHT: 182 LBS | RESPIRATION RATE: 18 BRPM | OXYGEN SATURATION: 99 %

## 2024-03-14 DIAGNOSIS — G62.9 PERIPHERAL POLYNEUROPATHY: Primary | ICD-10-CM

## 2024-03-14 PROCEDURE — 1101F PT FALLS ASSESS-DOCD LE1/YR: CPT | Mod: CPTII,,, | Performed by: PSYCHIATRY & NEUROLOGY

## 2024-03-14 PROCEDURE — 1125F AMNT PAIN NOTED PAIN PRSNT: CPT | Mod: CPTII,,, | Performed by: PSYCHIATRY & NEUROLOGY

## 2024-03-14 PROCEDURE — 1159F MED LIST DOCD IN RCRD: CPT | Mod: CPTII,,, | Performed by: PSYCHIATRY & NEUROLOGY

## 2024-03-14 PROCEDURE — 3288F FALL RISK ASSESSMENT DOCD: CPT | Mod: CPTII,,, | Performed by: PSYCHIATRY & NEUROLOGY

## 2024-03-14 PROCEDURE — 3008F BODY MASS INDEX DOCD: CPT | Mod: CPTII,,, | Performed by: PSYCHIATRY & NEUROLOGY

## 2024-03-14 PROCEDURE — 3074F SYST BP LT 130 MM HG: CPT | Mod: CPTII,,, | Performed by: PSYCHIATRY & NEUROLOGY

## 2024-03-14 PROCEDURE — 99215 OFFICE O/P EST HI 40 MIN: CPT | Mod: PBBFAC | Performed by: PSYCHIATRY & NEUROLOGY

## 2024-03-14 PROCEDURE — 4010F ACE/ARB THERAPY RXD/TAKEN: CPT | Mod: CPTII,,, | Performed by: PSYCHIATRY & NEUROLOGY

## 2024-03-14 PROCEDURE — 3078F DIAST BP <80 MM HG: CPT | Mod: CPTII,,, | Performed by: PSYCHIATRY & NEUROLOGY

## 2024-03-14 PROCEDURE — 99214 OFFICE O/P EST MOD 30 MIN: CPT | Mod: S$PBB,,, | Performed by: PSYCHIATRY & NEUROLOGY

## 2024-03-14 NOTE — PATIENT INSTRUCTIONS
Cont Gabapentin 300mg but consider increasing dosage and frequency  Needs a physical and mental break from caring for her  with dementia- recommend he be placed in nursing facility- needs home healthy eval   Healthy lifestyle habits   F/u 3 months

## 2024-03-14 NOTE — PROGRESS NOTES
Subjective:       Patient ID: Rafia Singh is a 68 y.o. female     Chief Complaint:    Chief Complaint   Patient presents with    polyneuropathy     Pt. States shortness of breath and tired. Fatty liver with fibrosis. Diagnosed with sclerosis of the liver.        Allergies:  Nitrofurantoin monohyd/m-cryst    Current Medications:    Outpatient Encounter Medications as of 3/14/2024   Medication Sig Dispense Refill    amLODIPine (NORVASC) 5 MG tablet Take 5 mg by mouth once daily.      cholecalciferol, vitamin D3, (VITAMIN D3) 25 mcg (1,000 unit) capsule 1 capsule      colestipoL (COLESTID) 1 gram Tab Take 1 g by mouth 2 (two) times daily.      cyanocobalamin 1,000 mcg/mL injection INJECT 1 ML INTO THE SKIN MONTHLY AS DIRECTED      ergocalciferol (ERGOCALCIFEROL) 50,000 unit Cap       furosemide (LASIX) 20 MG tablet Take 40 mg by mouth once daily.      gabapentin (NEURONTIN) 300 MG capsule One tab po bid or tid 270 capsule 3    glimepiride (AMARYL) 4 MG tablet Take 4 mg by mouth once daily.      lisinopriL (PRINIVIL,ZESTRIL) 20 MG tablet Take 20 mg by mouth once daily.      metFORMIN (GLUCOPHAGE-XR) 500 MG ER 24hr tablet Take 500 mg by mouth once daily.      omeprazole (PRILOSEC) 20 MG capsule Take 20 mg by mouth once daily.      ONETOUCH VERIO TEST STRIPS Strp       sertraline (ZOLOFT) 100 MG tablet Take 1 tablet (100 mg total) by mouth once daily. 30 tablet 1     No facility-administered encounter medications on file as of 3/14/2024.       History of Present Illness  69 yo WF w/ periph neuropathy fairly wellcontrolled on current Ede 300 mg bid-tid   Also recent dx CAMPOS/cirrhosis and COVID   Still w/ severe stress caring for  w/ severe dementia          Past Medical History:   Diagnosis Date    Aortic valve sclerosis     with murmur    Diabetes mellitus, type 2     NIDDM    GERD (gastroesophageal reflux disease)     High cholesterol     High triglycerides     Hypertension     Murmur     MVP (mitral  "valve prolapse)     JUDI on CPAP        Past Surgical History:   Procedure Laterality Date    CHOLECYSTECTOMY  2010    HYSTERECTOMY      OOPHORECTOMY      SHOULDER ARTHROSCOPY Left 12/2020    SINUS SURGERY      trigger thumb      TUBAL LIGATION         Social History  Ms. Singh  reports that she has never smoked. She has never used smokeless tobacco. She reports that she does not drink alcohol and does not use drugs.    Family History  Ms.'s Singh family history includes Heart disease in her brother and mother; Heart failure in her mother; No Known Problems in her father, maternal aunt, maternal grandfather, maternal grandmother, maternal uncle, paternal aunt, paternal grandfather, paternal grandmother, paternal uncle, and sister.    Review of Systems  Review of Systems   Neurological:  Positive for tingling and sensory change.   Psychiatric/Behavioral:  Positive for depression.    All other systems reviewed and are negative.     Objective:   BP (!) 100/58 (BP Location: Right arm, Patient Position: Sitting, BP Method: Large (Manual))   Pulse 72   Resp 18   Ht 5' 4" (1.626 m)   Wt 82.6 kg (182 lb)   SpO2 99%   BMI 31.24 kg/m²    NEUROLOGICAL EXAMINATION:     MENTAL STATUS   Oriented to person, place, and time.   Level of consciousness: alert  Knowledge: good.     CRANIAL NERVES   Cranial nerves II through XII intact.     MOTOR EXAM     Strength   Strength 5/5 throughout.     SENSORY EXAM        Paresthesias in ext's     GAIT AND COORDINATION     Gait  Gait: normal       Physical Exam  Vitals reviewed.   Constitutional:       Appearance: She is normal weight.   Neurological:      General: No focal deficit present.      Mental Status: She is alert and oriented to person, place, and time. Mental status is at baseline.      Cranial Nerves: Cranial nerves 2-12 are intact.      Motor: Motor strength is normal.     Gait: Gait is intact.          Assessment:     Peripheral polyneuropathy         Primary Diagnosis " and ICD10  Peripheral polyneuropathy [G62.9]    Plan:     Patient Instructions   Cont Gabapentin 300mg but consider increasing dosage and frequency  Needs a physical and mental break from caring for her  with dementia- recommend he be placed in nursing facility- needs home healthy eval   Healthy lifestyle habits   F/u 3 months     There are no discontinued medications.    Requested Prescriptions      No prescriptions requested or ordered in this encounter

## 2024-04-08 ENCOUNTER — OFFICE VISIT (OUTPATIENT)
Dept: CARDIOLOGY | Facility: CLINIC | Age: 69
End: 2024-04-08
Payer: MEDICARE

## 2024-04-08 VITALS
OXYGEN SATURATION: 95 % | WEIGHT: 183.81 LBS | HEIGHT: 64 IN | BODY MASS INDEX: 31.38 KG/M2 | DIASTOLIC BLOOD PRESSURE: 60 MMHG | SYSTOLIC BLOOD PRESSURE: 122 MMHG | HEART RATE: 66 BPM

## 2024-04-08 DIAGNOSIS — I10 HYPERTENSION, ESSENTIAL: Chronic | ICD-10-CM

## 2024-04-08 DIAGNOSIS — K21.9 GASTROESOPHAGEAL REFLUX DISEASE WITHOUT ESOPHAGITIS: Chronic | ICD-10-CM

## 2024-04-08 DIAGNOSIS — I34.1 MVP (MITRAL VALVE PROLAPSE): Chronic | ICD-10-CM

## 2024-04-08 DIAGNOSIS — G47.33 OSA (OBSTRUCTIVE SLEEP APNEA): Chronic | ICD-10-CM

## 2024-04-08 DIAGNOSIS — I73.9 PAD (PERIPHERAL ARTERY DISEASE): Chronic | ICD-10-CM

## 2024-04-08 DIAGNOSIS — I35.8 AORTIC VALVE SCLEROSIS: Primary | Chronic | ICD-10-CM

## 2024-04-08 DIAGNOSIS — E11.9 DIABETES MELLITUS WITHOUT COMPLICATION: Chronic | ICD-10-CM

## 2024-04-08 DIAGNOSIS — E78.5 HYPERLIPIDEMIA, UNSPECIFIED HYPERLIPIDEMIA TYPE: Chronic | ICD-10-CM

## 2024-04-08 PROCEDURE — 3288F FALL RISK ASSESSMENT DOCD: CPT | Mod: CPTII,,, | Performed by: INTERNAL MEDICINE

## 2024-04-08 PROCEDURE — 1159F MED LIST DOCD IN RCRD: CPT | Mod: CPTII,,, | Performed by: INTERNAL MEDICINE

## 2024-04-08 PROCEDURE — 4010F ACE/ARB THERAPY RXD/TAKEN: CPT | Mod: CPTII,,, | Performed by: INTERNAL MEDICINE

## 2024-04-08 PROCEDURE — 99214 OFFICE O/P EST MOD 30 MIN: CPT | Mod: PBBFAC,25 | Performed by: INTERNAL MEDICINE

## 2024-04-08 PROCEDURE — 3074F SYST BP LT 130 MM HG: CPT | Mod: CPTII,,, | Performed by: INTERNAL MEDICINE

## 2024-04-08 PROCEDURE — 3078F DIAST BP <80 MM HG: CPT | Mod: CPTII,,, | Performed by: INTERNAL MEDICINE

## 2024-04-08 PROCEDURE — 1160F RVW MEDS BY RX/DR IN RCRD: CPT | Mod: CPTII,,, | Performed by: INTERNAL MEDICINE

## 2024-04-08 PROCEDURE — 1100F PTFALLS ASSESS-DOCD GE2>/YR: CPT | Mod: CPTII,,, | Performed by: INTERNAL MEDICINE

## 2024-04-08 PROCEDURE — 93005 ELECTROCARDIOGRAM TRACING: CPT | Mod: PBBFAC | Performed by: INTERNAL MEDICINE

## 2024-04-08 PROCEDURE — 99214 OFFICE O/P EST MOD 30 MIN: CPT | Mod: S$PBB,,, | Performed by: INTERNAL MEDICINE

## 2024-04-08 PROCEDURE — 3008F BODY MASS INDEX DOCD: CPT | Mod: CPTII,,, | Performed by: INTERNAL MEDICINE

## 2024-04-08 PROCEDURE — 93010 ELECTROCARDIOGRAM REPORT: CPT | Mod: S$PBB,,, | Performed by: INTERNAL MEDICINE

## 2024-04-09 LAB
OHS QRS DURATION: 90 MS
OHS QTC CALCULATION: 455 MS

## 2024-04-22 NOTE — PROGRESS NOTES
PCP: Chelsea Butler II, MD    Referring Provider:     Subjective:   Rafia Singh is a 69 y.o. female with hx of aortic valve sclerosis with murmur, HTN, HLD, MVP, and NIDDM who presents for one year follow up.      4/4/23--Rafia Singh is a 69 y.o. female with hx of aortic valve sclerosis with murmur, HTN, HLD, MVP, and NIDDM who presents for one year follow up.          Fhx:  Family History   Problem Relation Name Age of Onset    Heart failure Mother      Heart disease Mother      No Known Problems Father      No Known Problems Sister      Heart disease Brother      No Known Problems Maternal Aunt      No Known Problems Maternal Uncle      No Known Problems Paternal Aunt      No Known Problems Paternal Uncle      No Known Problems Maternal Grandmother      No Known Problems Maternal Grandfather      No Known Problems Paternal Grandmother      No Known Problems Paternal Grandfather      Stroke Neg Hx      Diabetes Neg Hx       Shx:   Social History     Socioeconomic History    Marital status:    Tobacco Use    Smoking status: Never    Smokeless tobacco: Never   Substance and Sexual Activity    Alcohol use: Never    Drug use: Never    Sexual activity: Not Currently       EKG   4/8/24--sinus rhythm with 1st degree AV block, low voltage QRS, 70 bpm  4/4/23--NSR, 70 bpm  4/4/22--NSR, 76 bpm    Echo  Results for orders placed during the hospital encounter of 05/01/23    Echo    Interpretation Summary  · The left ventricle is normal in size with mild concentric hypertrophy and normal systolic function.  · The estimated ejection fraction is 60%.  · Mild aortic regurgitation.  · Mild mitral regurgitation.  · Mild tricuspid regurgitation.  · Normal right ventricular size with normal right ventricular systolic function.  · There is mild aortic valve stenosis.  · Aortic valve area is 1.40 cm2; peak velocity is 2.5 m/s; mean gradient is 14 mmHg.  · Normal left ventricular diastolic  function.      2/7/22--Interpretation Summary  · The left ventricle is normal in size with normal systolic function.  · The estimated ejection fraction is 60%.  · Left ventricular diastolic dysfunction.  · Mild right ventricular enlargement.  · Mild right atrial enlargement.  · Mild tricuspid regurgitation.  · Normal central venous pressure (3 mmHg).  · The estimated PA systolic pressure is 24 mmHg.    [facility]:    7/22/14--No CAD.  EF 60%. No MR.       Lab Results   Component Value Date    K 3.3 (L) 12/15/2020       Lab Results   Component Value Date    HGB 11.9 (L) 12/15/2020    HCT 33.8 (L) 12/15/2020           Current Outpatient Medications:     amLODIPine (NORVASC) 5 MG tablet, Take 5 mg by mouth once daily., Disp: , Rfl:     cholecalciferol, vitamin D3, (VITAMIN D3) 25 mcg (1,000 unit) capsule, 1 capsule, Disp: , Rfl:     colestipoL (COLESTID) 1 gram Tab, Take 1 g by mouth 2 (two) times daily., Disp: , Rfl:     cyanocobalamin 1,000 mcg/mL injection, INJECT 1 ML INTO THE SKIN MONTHLY AS DIRECTED, Disp: , Rfl:     ergocalciferol (ERGOCALCIFEROL) 50,000 unit Cap, , Disp: , Rfl:     furosemide (LASIX) 20 MG tablet, Take 40 mg by mouth once daily., Disp: , Rfl:     gabapentin (NEURONTIN) 300 MG capsule, One tab po bid or tid, Disp: 270 capsule, Rfl: 3    glimepiride (AMARYL) 4 MG tablet, Take 4 mg by mouth once daily., Disp: , Rfl:     lisinopriL (PRINIVIL,ZESTRIL) 20 MG tablet, Take 20 mg by mouth once daily., Disp: , Rfl:     metFORMIN (GLUCOPHAGE-XR) 500 MG ER 24hr tablet, Take 500 mg by mouth once daily., Disp: , Rfl:     omeprazole (PRILOSEC) 20 MG capsule, Take 20 mg by mouth once daily., Disp: , Rfl:     ONETOUCH VERIO TEST STRIPS Strp, , Disp: , Rfl:     sertraline (ZOLOFT) 100 MG tablet, Take 1 tablet (100 mg total) by mouth once daily., Disp: 30 tablet, Rfl: 1  Medications reviewed and reconciled by pt's list.     Review of Systems   Respiratory:  Positive for shortness of breath.    Cardiovascular:  Positive  "for chest pain, palpitations and leg swelling.   Neurological:  Negative for loss of consciousness.           Objective:   /60 (BP Location: Left arm, Patient Position: Sitting)   Pulse 66   Ht 5' 4" (1.626 m)   Wt 83.4 kg (183 lb 12.8 oz)   SpO2 95%   BMI 31.55 kg/m²     Physical Exam  Vitals reviewed.   Constitutional:       General: She is not in acute distress.     Appearance: Normal appearance.   HENT:      Head: Normocephalic and atraumatic.   Neck:      Vascular: No carotid bruit or JVD.   Cardiovascular:      Rate and Rhythm: Normal rate and regular rhythm.      Pulses: Normal pulses.      Heart sounds: Murmur heard.      Systolic murmur is present with a grade of 2/6.      Comments: II/ VI HARI LSB   Pulmonary:      Effort: Pulmonary effort is normal.      Breath sounds: Normal breath sounds.   Musculoskeletal:      Right lower leg: No edema.      Left lower leg: No edema.   Skin:     General: Skin is warm and dry.   Neurological:      General: No focal deficit present.      Mental Status: She is alert.           Assessment:     1. Aortic valve sclerosis      with murmur      2. MVP (mitral valve prolapse)  EKG 12-lead    EKG 12-lead      3. Hypertension, essential        4. Hyperlipidemia, unspecified hyperlipidemia type        5. JUDI (obstructive sleep apnea)        6. Diabetes mellitus without complication        7. Gastroesophageal reflux disease without esophagitis        8. PAD (peripheral artery disease)              Plan:   FLP/ ALT- by PCP, pt will get a copy sent to me.   F/u in one year.           "

## 2024-04-24 PROBLEM — I73.9 PAD (PERIPHERAL ARTERY DISEASE): Chronic | Status: ACTIVE | Noted: 2024-04-24

## 2024-04-24 PROBLEM — I34.1 MVP (MITRAL VALVE PROLAPSE): Chronic | Status: ACTIVE | Noted: 2024-04-24

## 2024-06-17 ENCOUNTER — OFFICE VISIT (OUTPATIENT)
Dept: NEUROLOGY | Facility: CLINIC | Age: 69
End: 2024-06-17
Payer: MEDICARE

## 2024-06-17 ENCOUNTER — HOSPITAL ENCOUNTER (OUTPATIENT)
Dept: RADIOLOGY | Facility: HOSPITAL | Age: 69
Discharge: HOME | End: 2024-06-17
Attending: PSYCHIATRY & NEUROLOGY
Payer: MEDICARE

## 2024-06-17 VITALS
BODY MASS INDEX: 32.1 KG/M2 | DIASTOLIC BLOOD PRESSURE: 54 MMHG | OXYGEN SATURATION: 94 % | HEIGHT: 64 IN | HEART RATE: 86 BPM | WEIGHT: 188 LBS | SYSTOLIC BLOOD PRESSURE: 118 MMHG | RESPIRATION RATE: 18 BRPM

## 2024-06-17 DIAGNOSIS — R06.02 SOB (SHORTNESS OF BREATH): Primary | ICD-10-CM

## 2024-06-17 DIAGNOSIS — R06.02 SOB (SHORTNESS OF BREATH): ICD-10-CM

## 2024-06-17 DIAGNOSIS — G62.9 PERIPHERAL POLYNEUROPATHY: ICD-10-CM

## 2024-06-17 PROCEDURE — 4010F ACE/ARB THERAPY RXD/TAKEN: CPT | Mod: CPTII,,, | Performed by: PSYCHIATRY & NEUROLOGY

## 2024-06-17 PROCEDURE — 1159F MED LIST DOCD IN RCRD: CPT | Mod: CPTII,,, | Performed by: PSYCHIATRY & NEUROLOGY

## 2024-06-17 PROCEDURE — 99999 PR PBB SHADOW E&M-EST. PATIENT-LVL V: CPT | Mod: PBBFAC,,, | Performed by: PSYCHIATRY & NEUROLOGY

## 2024-06-17 PROCEDURE — 99214 OFFICE O/P EST MOD 30 MIN: CPT | Mod: S$PBB,,, | Performed by: PSYCHIATRY & NEUROLOGY

## 2024-06-17 PROCEDURE — 1101F PT FALLS ASSESS-DOCD LE1/YR: CPT | Mod: CPTII,,, | Performed by: PSYCHIATRY & NEUROLOGY

## 2024-06-17 PROCEDURE — 3008F BODY MASS INDEX DOCD: CPT | Mod: CPTII,,, | Performed by: PSYCHIATRY & NEUROLOGY

## 2024-06-17 PROCEDURE — 3288F FALL RISK ASSESSMENT DOCD: CPT | Mod: CPTII,,, | Performed by: PSYCHIATRY & NEUROLOGY

## 2024-06-17 PROCEDURE — 3074F SYST BP LT 130 MM HG: CPT | Mod: CPTII,,, | Performed by: PSYCHIATRY & NEUROLOGY

## 2024-06-17 PROCEDURE — 71046 X-RAY EXAM CHEST 2 VIEWS: CPT | Mod: TC

## 2024-06-17 PROCEDURE — 1125F AMNT PAIN NOTED PAIN PRSNT: CPT | Mod: CPTII,,, | Performed by: PSYCHIATRY & NEUROLOGY

## 2024-06-17 PROCEDURE — 99215 OFFICE O/P EST HI 40 MIN: CPT | Mod: PBBFAC,25 | Performed by: PSYCHIATRY & NEUROLOGY

## 2024-06-17 PROCEDURE — 1160F RVW MEDS BY RX/DR IN RCRD: CPT | Mod: CPTII,,, | Performed by: PSYCHIATRY & NEUROLOGY

## 2024-06-17 PROCEDURE — 3078F DIAST BP <80 MM HG: CPT | Mod: CPTII,,, | Performed by: PSYCHIATRY & NEUROLOGY

## 2024-06-17 RX ORDER — MULTIVIT,IRON,MINERALS/LUTEIN
TABLET ORAL
COMMUNITY

## 2024-06-17 RX ORDER — IBUPROFEN 100 MG/5ML
2 SUSPENSION, ORAL (FINAL DOSE FORM) ORAL EVERY MORNING
COMMUNITY

## 2024-06-17 RX ORDER — GLUCOSAMINE/CHONDR SU A SOD 750-600 MG
1 TABLET ORAL EVERY MORNING
COMMUNITY

## 2024-06-17 NOTE — PATIENT INSTRUCTIONS
Cont Gabapentin 300-600 mg 2-3 x daily  Stay on liver transplant list for cirrhosis  Rec hiring nursing aide to care for  w/ dementia  Trial melatonin or CBD   F/u 3 months

## 2024-06-17 NOTE — PROGRESS NOTES
Subjective:       Patient ID: Rafia Singh is a 69 y.o. female     Chief Complaint:    Chief Complaint   Patient presents with    polyneuropathy     Pt. States still having a lot of pain with medicine. Off balance. Diagnosed with sclerosis of the liver.        Allergies:  Nitrofurantoin monohyd/m-cryst    Current Medications:    Outpatient Encounter Medications as of 6/17/2024   Medication Sig Dispense Refill    amLODIPine (NORVASC) 5 MG tablet Take 5 mg by mouth once daily.      ascorbic acid, vitamin C, (VITAMIN C) 1000 MG tablet Take 2 tablets by mouth every morning.      biotin 2,500 mcg Cap Take 1 capsule by mouth every morning.      cholecalciferol, vitamin D3, (VITAMIN D3) 25 mcg (1,000 unit) capsule 1 capsule      colestipoL (COLESTID) 1 gram Tab Take 1 g by mouth 2 (two) times daily.      ergocalciferol (ERGOCALCIFEROL) 50,000 unit Cap       furosemide (LASIX) 20 MG tablet Take 40 mg by mouth once daily.      gabapentin (NEURONTIN) 300 MG capsule One tab po bid or tid 270 capsule 3    garlic Tab Take 1 capsule by mouth every morning.      glimepiride (AMARYL) 4 MG tablet Take 4 mg by mouth once daily.      lisinopriL (PRINIVIL,ZESTRIL) 20 MG tablet Take 20 mg by mouth once daily.      metFORMIN (GLUCOPHAGE-XR) 500 MG ER 24hr tablet Take 500 mg by mouth once daily.      multivit-min-iron-FA-vit K-lut (CENTRUM SILVER WOMEN) 8 mg iron-400 mcg-50 mcg Tab as directed Orally      omeprazole (PRILOSEC) 20 MG capsule Take 20 mg by mouth once daily.      ONETOUCH VERIO TEST STRIPS Strp       sertraline (ZOLOFT) 100 MG tablet Take 1 tablet (100 mg total) by mouth once daily. 30 tablet 1    cyanocobalamin 1,000 mcg/mL injection INJECT 1 ML INTO THE SKIN MONTHLY AS DIRECTED (Patient not taking: Reported on 6/17/2024)       No facility-administered encounter medications on file as of 6/17/2024.       History of Present Illness  70 yo WF /w periph neuropathy fairly well controlled on Ede 300mg bid-tid   Under  "great stress from caring for mother  in law and  w/ dementia  Now has celiac, CAMPOS, cirrhosis            Past Medical History:   Diagnosis Date    Aortic valve sclerosis     with murmur    Diabetes mellitus, type 2     NIDDM    GERD (gastroesophageal reflux disease)     High cholesterol     High triglycerides     Hypertension     Murmur     MVP (mitral valve prolapse)     JUDI on CPAP        Past Surgical History:   Procedure Laterality Date    CHOLECYSTECTOMY  2010    HYSTERECTOMY      OOPHORECTOMY      SHOULDER ARTHROSCOPY Left 12/2020    SINUS SURGERY      trigger thumb      TUBAL LIGATION         Social History  Ms. Singh  reports that she has never smoked. She has never used smokeless tobacco. She reports that she does not drink alcohol and does not use drugs.    Family History  Ms.'s Singh family history includes Heart disease in her brother and mother; Heart failure in her mother; No Known Problems in her father, maternal aunt, maternal grandfather, maternal grandmother, maternal uncle, paternal aunt, paternal grandfather, paternal grandmother, paternal uncle, and sister.    Review of Systems  Review of Systems   Gastrointestinal:  Positive for constipation and diarrhea.   Musculoskeletal:  Positive for joint pain and myalgias.   Psychiatric/Behavioral:  Positive for depression. The patient is nervous/anxious.    All other systems reviewed and are negative.     Objective:   BP (!) 118/54 (BP Location: Right arm, Patient Position: Sitting, BP Method: Large (Manual))   Pulse 86   Resp 18   Ht 5' 4" (1.626 m)   Wt 85.3 kg (188 lb)   SpO2 (!) 94%   BMI 32.27 kg/m²    NEUROLOGICAL EXAMINATION:     MENTAL STATUS   Oriented to person, place, and time.   Level of consciousness: alert  Knowledge: good.     CRANIAL NERVES   Cranial nerves II through XII intact.     MOTOR EXAM     Strength   Strength 5/5 throughout.     GAIT AND COORDINATION     Gait  Gait: normal       Physical Exam  Vitals reviewed. "   Constitutional:       Appearance: She is normal weight.   Neurological:      Mental Status: She is alert and oriented to person, place, and time. Mental status is at baseline.      Cranial Nerves: Cranial nerves 2-12 are intact.      Motor: Motor strength is normal.     Gait: Gait is intact.          Assessment:     Peripheral polyneuropathy         Primary Diagnosis and ICD10  Peripheral polyneuropathy [G62.9]    Plan:     Patient Instructions   Cont Gabapentin 300-600 mg 2-3 x daily  Stay on liver transplant list for cirrhosis  Rec hiring nursing aide to care for  w/ dementia  Trial melatonin or CBD   F/u 3-4 months       There are no discontinued medications.    Requested Prescriptions      No prescriptions requested or ordered in this encounter

## 2024-06-27 ENCOUNTER — HOSPITAL ENCOUNTER (OUTPATIENT)
Dept: RADIOLOGY | Facility: HOSPITAL | Age: 69
Discharge: HOME | End: 2024-06-27
Attending: RADIOLOGY
Payer: MEDICARE

## 2024-06-27 DIAGNOSIS — Z12.31 VISIT FOR SCREENING MAMMOGRAM: ICD-10-CM

## 2024-06-27 PROCEDURE — 77063 BREAST TOMOSYNTHESIS BI: CPT | Mod: 26,,, | Performed by: RADIOLOGY

## 2024-06-27 PROCEDURE — 77067 SCR MAMMO BI INCL CAD: CPT | Mod: 26,,, | Performed by: RADIOLOGY

## 2024-06-27 PROCEDURE — 77067 SCR MAMMO BI INCL CAD: CPT | Mod: TC

## 2024-08-14 ENCOUNTER — TELEPHONE (OUTPATIENT)
Dept: TRANSPLANT | Facility: CLINIC | Age: 69
End: 2024-08-14

## 2024-08-14 NOTE — TELEPHONE ENCOUNTER
----- Message from Ginger Giron sent at 8/14/2024 12:32 PM CDT -----  Regarding: FW: Disregard Fax  Contact: Concepción from Dr Vladislav Pappas's Office    ----- Message -----  From: Madeleine Godoy  Sent: 8/14/2024  12:19 PM CDT  To: [facility] Pre-Liver Transplant Non-Clinical  Subject: Disregard Fax                                    CONSULT/ADVISORY    Name of Caller:  Concepción from Dr Vladislav Pappas's Office    Contact Preference:  [phone]    Nature of Call:  Calling to inform the staff to disregard fax for referral she just faxed over.  States pt changed her mind and doesn't want to come to [facility].

## 2024-10-29 ENCOUNTER — OFFICE VISIT (OUTPATIENT)
Dept: NEUROLOGY | Facility: CLINIC | Age: 69
End: 2024-10-29
Payer: MEDICARE

## 2024-10-29 VITALS
HEIGHT: 64 IN | WEIGHT: 184 LBS | RESPIRATION RATE: 18 BRPM | OXYGEN SATURATION: 96 % | DIASTOLIC BLOOD PRESSURE: 70 MMHG | SYSTOLIC BLOOD PRESSURE: 122 MMHG | HEART RATE: 62 BPM | BODY MASS INDEX: 31.41 KG/M2

## 2024-10-29 DIAGNOSIS — G62.9 PERIPHERAL POLYNEUROPATHY: Primary | ICD-10-CM

## 2024-10-29 DIAGNOSIS — M79.7 FIBROMYALGIA: Chronic | ICD-10-CM

## 2024-10-29 DIAGNOSIS — I73.9 PAD (PERIPHERAL ARTERY DISEASE): Chronic | ICD-10-CM

## 2024-10-29 PROCEDURE — 99999 PR PBB SHADOW E&M-EST. PATIENT-LVL V: CPT | Mod: PBBFAC,,, | Performed by: PSYCHIATRY & NEUROLOGY

## 2024-10-29 PROCEDURE — 1159F MED LIST DOCD IN RCRD: CPT | Mod: CPTII,,, | Performed by: PSYCHIATRY & NEUROLOGY

## 2024-10-29 PROCEDURE — 1101F PT FALLS ASSESS-DOCD LE1/YR: CPT | Mod: CPTII,,, | Performed by: PSYCHIATRY & NEUROLOGY

## 2024-10-29 PROCEDURE — 3074F SYST BP LT 130 MM HG: CPT | Mod: CPTII,,, | Performed by: PSYCHIATRY & NEUROLOGY

## 2024-10-29 PROCEDURE — 3008F BODY MASS INDEX DOCD: CPT | Mod: CPTII,,, | Performed by: PSYCHIATRY & NEUROLOGY

## 2024-10-29 PROCEDURE — 99214 OFFICE O/P EST MOD 30 MIN: CPT | Mod: S$PBB,,, | Performed by: PSYCHIATRY & NEUROLOGY

## 2024-10-29 PROCEDURE — 99215 OFFICE O/P EST HI 40 MIN: CPT | Mod: PBBFAC | Performed by: PSYCHIATRY & NEUROLOGY

## 2024-10-29 PROCEDURE — 4010F ACE/ARB THERAPY RXD/TAKEN: CPT | Mod: CPTII,,, | Performed by: PSYCHIATRY & NEUROLOGY

## 2024-10-29 PROCEDURE — 3078F DIAST BP <80 MM HG: CPT | Mod: CPTII,,, | Performed by: PSYCHIATRY & NEUROLOGY

## 2024-10-29 PROCEDURE — 3288F FALL RISK ASSESSMENT DOCD: CPT | Mod: CPTII,,, | Performed by: PSYCHIATRY & NEUROLOGY

## 2024-10-29 RX ORDER — BUSPIRONE HYDROCHLORIDE 10 MG/1
TABLET ORAL
COMMUNITY
Start: 2024-10-25

## 2024-12-09 ENCOUNTER — LAB VISIT (OUTPATIENT)
Dept: LAB | Facility: HOSPITAL | Age: 69
End: 2024-12-09
Attending: INTERNAL MEDICINE
Payer: MEDICARE

## 2024-12-09 DIAGNOSIS — R04.0 EPISTAXIS: ICD-10-CM

## 2024-12-09 DIAGNOSIS — T14.8XXA SIMPLE BRUISING: ICD-10-CM

## 2024-12-09 DIAGNOSIS — D69.6 THROMBOCYTOPENIA, UNSPECIFIED: ICD-10-CM

## 2024-12-09 DIAGNOSIS — K74.60 HEPATIC CIRRHOSIS, UNSPECIFIED HEPATIC CIRRHOSIS TYPE, UNSPECIFIED WHETHER ASCITES PRESENT: Primary | ICD-10-CM

## 2024-12-09 LAB
ALBUMIN SERPL BCP-MCNC: 3.8 G/DL (ref 3.4–4.8)
ALBUMIN/GLOB SERPL: 1 {RATIO}
ALP SERPL-CCNC: 95 U/L (ref 40–150)
ALT SERPL W P-5'-P-CCNC: 29 U/L
ANION GAP SERPL CALCULATED.3IONS-SCNC: 12 MMOL/L (ref 7–16)
AST SERPL W P-5'-P-CCNC: 40 U/L (ref 5–34)
BASOPHILS # BLD AUTO: 0.03 K/UL (ref 0–0.2)
BASOPHILS NFR BLD AUTO: 1 % (ref 0–1)
BILIRUB SERPL-MCNC: 1.5 MG/DL
BUN SERPL-MCNC: 19 MG/DL (ref 10–20)
BUN/CREAT SERPL: 31 (ref 6–20)
CALCIUM SERPL-MCNC: 9.3 MG/DL (ref 8.4–10.2)
CHLORIDE SERPL-SCNC: 107 MMOL/L (ref 98–107)
CO2 SERPL-SCNC: 25 MMOL/L (ref 23–31)
CREAT SERPL-MCNC: 0.62 MG/DL (ref 0.55–1.02)
DIFFERENTIAL METHOD BLD: ABNORMAL
EGFR (NO RACE VARIABLE) (RUSH/TITUS): 98 ML/MIN/1.73M2
EOSINOPHIL # BLD AUTO: 0.12 K/UL (ref 0–0.5)
EOSINOPHIL NFR BLD AUTO: 3.8 % (ref 1–4)
ERYTHROCYTE [DISTWIDTH] IN BLOOD BY AUTOMATED COUNT: 13.4 % (ref 11.5–14.5)
GLOBULIN SER-MCNC: 3.8 G/DL (ref 2–4)
GLUCOSE SERPL-MCNC: 140 MG/DL (ref 82–115)
HCT VFR BLD AUTO: 37.3 % (ref 38–47)
HGB BLD-MCNC: 12.3 G/DL (ref 12–16)
IMM GRANULOCYTES # BLD AUTO: 0.01 K/UL (ref 0–0.04)
IMM GRANULOCYTES NFR BLD: 0.3 % (ref 0–0.4)
INR BLD: 1.14
LYMPHOCYTES # BLD AUTO: 1.04 K/UL (ref 1–4.8)
LYMPHOCYTES NFR BLD AUTO: 33 % (ref 27–41)
MCH RBC QN AUTO: 32.2 PG (ref 27–31)
MCHC RBC AUTO-ENTMCNC: 33 G/DL (ref 32–36)
MCV RBC AUTO: 97.6 FL (ref 80–96)
MONOCYTES # BLD AUTO: 0.39 K/UL (ref 0–0.8)
MONOCYTES NFR BLD AUTO: 12.4 % (ref 2–6)
MPC BLD CALC-MCNC: 9.5 FL (ref 9.4–12.4)
NEUTROPHILS # BLD AUTO: 1.56 K/UL (ref 1.8–7.7)
NEUTROPHILS NFR BLD AUTO: 49.5 % (ref 53–65)
NRBC # BLD AUTO: 0 X10E3/UL
NRBC, AUTO (.00): 0 %
PLATELET # BLD AUTO: 71 K/UL (ref 150–400)
POTASSIUM SERPL-SCNC: 4 MMOL/L (ref 3.5–5.1)
PROT SERPL-MCNC: 7.6 G/DL (ref 5.8–7.6)
PROTHROMBIN TIME: 15.4 SECONDS (ref 11.7–14.7)
RBC # BLD AUTO: 3.82 M/UL (ref 4.2–5.4)
SODIUM SERPL-SCNC: 140 MMOL/L (ref 136–145)
WBC # BLD AUTO: 3.15 K/UL (ref 4.5–11)

## 2024-12-09 PROCEDURE — 36415 COLL VENOUS BLD VENIPUNCTURE: CPT

## 2024-12-09 PROCEDURE — 85025 COMPLETE CBC W/AUTO DIFF WBC: CPT

## 2024-12-09 PROCEDURE — 80053 COMPREHEN METABOLIC PANEL: CPT

## 2024-12-09 PROCEDURE — 85610 PROTHROMBIN TIME: CPT

## 2025-01-30 ENCOUNTER — TELEPHONE (OUTPATIENT)
Dept: CARDIOLOGY | Facility: CLINIC | Age: 70
End: 2025-01-30
Payer: MEDICARE

## 2025-01-30 NOTE — TELEPHONE ENCOUNTER
----- Message from Nicole sent at 1/30/2025  2:45 PM CST -----  Hello, Patient needs to be called by Sofia molina, she got some information from [facility] that she needs to share with Doctor, because she needs to have a test done. Please call her at [phone].    Thank you, [facility]    Spoke with patient about above note will speak with Dr Sarmiento about patient requesting to be seen before her appt time due to increased sob as she reported

## 2025-04-23 ENCOUNTER — HOSPITAL ENCOUNTER (OUTPATIENT)
Dept: RADIOLOGY | Facility: HOSPITAL | Age: 70
Discharge: HOME | End: 2025-04-23
Attending: INTERNAL MEDICINE
Payer: MEDICARE

## 2025-04-23 DIAGNOSIS — K74.60 CIRRHOSIS: ICD-10-CM

## 2025-04-23 PROCEDURE — 76700 US EXAM ABDOM COMPLETE: CPT | Mod: TC

## 2025-05-01 ENCOUNTER — OFFICE VISIT (OUTPATIENT)
Dept: CARDIOLOGY | Facility: CLINIC | Age: 70
End: 2025-05-01
Payer: MEDICARE

## 2025-05-01 VITALS
OXYGEN SATURATION: 96 % | SYSTOLIC BLOOD PRESSURE: 140 MMHG | HEART RATE: 80 BPM | WEIGHT: 188.38 LBS | DIASTOLIC BLOOD PRESSURE: 74 MMHG | BODY MASS INDEX: 32.16 KG/M2 | HEIGHT: 64 IN

## 2025-05-01 DIAGNOSIS — E78.5 HYPERLIPIDEMIA, UNSPECIFIED HYPERLIPIDEMIA TYPE: Chronic | ICD-10-CM

## 2025-05-01 DIAGNOSIS — I35.8 AORTIC VALVE SCLEROSIS: Chronic | ICD-10-CM

## 2025-05-01 DIAGNOSIS — K21.9 GASTROESOPHAGEAL REFLUX DISEASE WITHOUT ESOPHAGITIS: Chronic | ICD-10-CM

## 2025-05-01 DIAGNOSIS — I10 HYPERTENSION, ESSENTIAL: Chronic | ICD-10-CM

## 2025-05-01 DIAGNOSIS — Q21.12 PFO (PATENT FORAMEN OVALE): Primary | Chronic | ICD-10-CM

## 2025-05-01 DIAGNOSIS — E11.9 DIABETES MELLITUS WITHOUT COMPLICATION: Chronic | ICD-10-CM

## 2025-05-01 DIAGNOSIS — K75.81 NASH (NONALCOHOLIC STEATOHEPATITIS): Chronic | ICD-10-CM

## 2025-05-01 PROCEDURE — 3077F SYST BP >= 140 MM HG: CPT | Mod: CPTII,,, | Performed by: INTERNAL MEDICINE

## 2025-05-01 PROCEDURE — 93005 ELECTROCARDIOGRAM TRACING: CPT | Mod: PBBFAC | Performed by: INTERNAL MEDICINE

## 2025-05-01 PROCEDURE — 93010 ELECTROCARDIOGRAM REPORT: CPT | Mod: S$PBB,,, | Performed by: INTERNAL MEDICINE

## 2025-05-01 PROCEDURE — 3078F DIAST BP <80 MM HG: CPT | Mod: CPTII,,, | Performed by: INTERNAL MEDICINE

## 2025-05-01 PROCEDURE — 1101F PT FALLS ASSESS-DOCD LE1/YR: CPT | Mod: CPTII,,, | Performed by: INTERNAL MEDICINE

## 2025-05-01 PROCEDURE — 3008F BODY MASS INDEX DOCD: CPT | Mod: CPTII,,, | Performed by: INTERNAL MEDICINE

## 2025-05-01 PROCEDURE — 3288F FALL RISK ASSESSMENT DOCD: CPT | Mod: CPTII,,, | Performed by: INTERNAL MEDICINE

## 2025-05-01 PROCEDURE — 1159F MED LIST DOCD IN RCRD: CPT | Mod: CPTII,,, | Performed by: INTERNAL MEDICINE

## 2025-05-01 PROCEDURE — 99999 PR PBB SHADOW E&M-EST. PATIENT-LVL IV: CPT | Mod: PBBFAC,,, | Performed by: INTERNAL MEDICINE

## 2025-05-01 PROCEDURE — 99214 OFFICE O/P EST MOD 30 MIN: CPT | Mod: S$PBB,,, | Performed by: INTERNAL MEDICINE

## 2025-05-01 PROCEDURE — 4010F ACE/ARB THERAPY RXD/TAKEN: CPT | Mod: CPTII,,, | Performed by: INTERNAL MEDICINE

## 2025-05-01 PROCEDURE — 99214 OFFICE O/P EST MOD 30 MIN: CPT | Mod: PBBFAC,25 | Performed by: INTERNAL MEDICINE

## 2025-05-02 LAB
OHS QRS DURATION: 94 MS
OHS QTC CALCULATION: 458 MS

## 2025-05-06 PROBLEM — Q21.12 PFO (PATENT FORAMEN OVALE): Chronic | Status: ACTIVE | Noted: 2025-05-06

## 2025-05-06 NOTE — PROGRESS NOTES
PCP: Chelsea Butler II, MD    Referring Provider:     Subjective:   Rafia Singh is a 70 y.o. female with hx of aortic valve sclerosis with murmur, HTN, HLD, MVP, and NIDDM  who presents for yearly follow up.     Fhx:  Family History   Problem Relation Name Age of Onset    Heart failure Mother      Heart disease Mother      No Known Problems Father      No Known Problems Sister      Heart disease Brother      No Known Problems Maternal Aunt      No Known Problems Maternal Uncle      No Known Problems Paternal Aunt      No Known Problems Paternal Uncle      No Known Problems Maternal Grandmother      No Known Problems Maternal Grandfather      No Known Problems Paternal Grandmother      No Known Problems Paternal Grandfather      Stroke Neg Hx      Diabetes Neg Hx       Shx: Social History[1]    EKG   5/1/25--NSR, inferior infarct- age undetermined, anterolateral infarct- age undetermined, 78 bpm  4/8/24--sinus rhythm with 1st degree AV block, low voltage QRS, 70 bpm  4/4/23--NSR, 70 bpm  4/4/22--NSR, 76 bpm    ECHO Results for orders placed during the hospital encounter of 05/01/23    Echo    Interpretation Summary  · The left ventricle is normal in size with mild concentric hypertrophy and normal systolic function.  · The estimated ejection fraction is 60%.  · Mild aortic regurgitation.  · Mild mitral regurgitation.  · Mild tricuspid regurgitation.  · Normal right ventricular size with normal right ventricular systolic function.  · There is mild aortic valve stenosis.  · Aortic valve area is 1.40 cm2; peak velocity is 2.5 m/s; mean gradient is 14 mmHg.  · Normal left ventricular diastolic function.    2/7/22--Interpretation Summary  · The left ventricle is normal in size with normal systolic function.  · The estimated ejection fraction is 60%.  · Left ventricular diastolic dysfunction.  · Mild right ventricular enlargement.  · Mild right atrial enlargement.  · Mild tricuspid regurgitation.  · Normal central  "venous pressure (3 mmHg).  · The estimated PA systolic pressure is 24 mmHg.     LHC:     7/22/14--No CAD.  EF 60%. No MR.          Lab Results   Component Value Date     12/09/2024    K 4.0 12/09/2024     12/09/2024    CO2 25 12/09/2024    BUN 19 12/09/2024    CREATININE 0.62 12/09/2024    CALCIUM 9.3 12/09/2024    ANIONGAP 12 12/09/2024         Lab Results   Component Value Date    WBC 3.15 (L) 12/09/2024    HGB 12.3 12/09/2024    HCT 37.3 (L) 12/09/2024    MCV 97.6 (H) 12/09/2024    PLT 71 (L) 12/09/2024         Current Medications[2]  Reviewed and reconciled.     Review of Systems   Respiratory:  Positive for shortness of breath.    Cardiovascular:  Positive for chest pain. Negative for palpitations and leg swelling.   Neurological:  Negative for loss of consciousness.       History of Present Illness    CHIEF COMPLAINT:  Patient presents today for follow up    CARDIAC:  She has known mitral, aortic, and TR, with the tricuspid issue noted to be minor. She reports dyspnea with minimal exertion and difficulty walking short distances. Recent echo with bubble study showed a positive right to left shunt.    LIVER DISEASE:  She was diagnosed with non-alcoholic steatohepatitis (CAMPOS) cirrhosis last year. She is currently being evaluated by two physicians for possible liver transplant candidacy. Platelet count has been low at 71 for over 2 years. Bone marrow aspiration was performed to rule out lymphoma and leukemia.      Objective:   BP (!) 140/74 (BP Location: Left arm, Patient Position: Sitting)   Pulse 80   Ht 5' 4" (1.626 m)   Wt 85.5 kg (188 lb 6.4 oz)   SpO2 96%   BMI 32.34 kg/m²     Physical Exam  Vitals reviewed.   Constitutional:       General: She is not in acute distress.     Appearance: Normal appearance.   HENT:      Head: Normocephalic and atraumatic.   Neck:      Vascular: No carotid bruit or JVD.   Cardiovascular:      Rate and Rhythm: Normal rate and regular rhythm.      Pulses: Normal " pulses.           Radial pulses are 2+ on the right side and 2+ on the left side.      Heart sounds: Normal heart sounds. No murmur heard.  Pulmonary:      Effort: Pulmonary effort is normal.      Breath sounds: Normal breath sounds.   Musculoskeletal:      Right lower leg: No edema.      Left lower leg: No edema.   Skin:     General: Skin is warm and dry.   Neurological:      Mental Status: She is alert.             Assessment:     1. PFO (patent foramen ovale)      small      2. Aortic valve sclerosis        3. Hypertension, essential  EKG 12-lead    EKG 12-lead      4. CAMPOS (nonalcoholic steatohepatitis)        5. Hyperlipidemia, unspecified hyperlipidemia type        6. Gastroesophageal reflux disease without esophagitis        7. Diabetes mellitus without complication            Assessment & Plan    I10 Hypertension, essential    IMPRESSION:  - Positive bubble study with right to left shunt from recent echocardiogram indicates possible ASD or PFO.         Plan:   Continue current medications  F/u in one year.         This note was generated with the assistance of ambient listening technology. Verbal consent was obtained by the patient and accompanying visitor(s) for the recording of patient appointment to facilitate this note. I attest to having reviewed and edited the generated note for accuracy, though some syntax or spelling errors may persist. Please contact the author of this note for any clarification.             [1]   Social History  Socioeconomic History    Marital status:    Tobacco Use    Smoking status: Never    Smokeless tobacco: Never   Substance and Sexual Activity    Alcohol use: Never    Drug use: Never    Sexual activity: Not Currently   [2]   Current Outpatient Medications:     amLODIPine (NORVASC) 5 MG tablet, Take 5 mg by mouth once daily., Disp: , Rfl:     ascorbic acid, vitamin C, (VITAMIN C) 1000 MG tablet, Take 2 tablets by mouth every morning., Disp: , Rfl:     biotin 2,500 mcg  Cap, Take 1 capsule by mouth every morning., Disp: , Rfl:     busPIRone (BUSPAR) 10 MG tablet, 1 tablet Orally Twice a day for 90 days as needed for anxiety, Disp: , Rfl:     cholecalciferol, vitamin D3, (VITAMIN D3) 25 mcg (1,000 unit) capsule, 1 capsule, Disp: , Rfl:     colestipoL (COLESTID) 1 gram Tab, Take 1 g by mouth 2 (two) times daily., Disp: , Rfl:     ergocalciferol (ERGOCALCIFEROL) 50,000 unit Cap, , Disp: , Rfl:     furosemide (LASIX) 20 MG tablet, Take 40 mg by mouth once daily., Disp: , Rfl:     gabapentin (NEURONTIN) 300 MG capsule, One tab po bid or tid, Disp: 270 capsule, Rfl: 3    garlic Tab, Take 1 capsule by mouth every morning., Disp: , Rfl:     glimepiride (AMARYL) 4 MG tablet, Take 4 mg by mouth once daily., Disp: , Rfl:     lisinopriL (PRINIVIL,ZESTRIL) 20 MG tablet, Take 20 mg by mouth once daily., Disp: , Rfl:     metFORMIN (GLUCOPHAGE-XR) 500 MG ER 24hr tablet, Take 500 mg by mouth once daily., Disp: , Rfl:     multivit-min-iron-FA-vit K-lut (CENTRUM SILVER WOMEN) 8 mg iron-400 mcg-50 mcg Tab, as directed Orally, Disp: , Rfl:     omeprazole (PRILOSEC) 20 MG capsule, Take 20 mg by mouth once daily., Disp: , Rfl:     ONETOUCH VERIO TEST STRIPS Strp, , Disp: , Rfl:     sertraline (ZOLOFT) 100 MG tablet, Take 1 tablet (100 mg total) by mouth once daily., Disp: 30 tablet, Rfl: 1

## 2025-08-05 ENCOUNTER — HOSPITAL ENCOUNTER (OUTPATIENT)
Dept: RADIOLOGY | Facility: HOSPITAL | Age: 70
Discharge: HOME | End: 2025-08-05
Attending: RADIOLOGY
Payer: MEDICARE

## 2025-08-05 DIAGNOSIS — Z12.31 SCREENING MAMMOGRAM FOR BREAST CANCER: ICD-10-CM

## 2025-08-05 PROCEDURE — 77067 SCR MAMMO BI INCL CAD: CPT | Mod: TC

## 2025-08-05 PROCEDURE — 77067 SCR MAMMO BI INCL CAD: CPT | Mod: 26,,, | Performed by: RADIOLOGY

## 2025-08-05 PROCEDURE — 77063 BREAST TOMOSYNTHESIS BI: CPT | Mod: 26,,, | Performed by: RADIOLOGY
